# Patient Record
Sex: FEMALE | Race: WHITE | NOT HISPANIC OR LATINO | Employment: UNEMPLOYED | URBAN - METROPOLITAN AREA
[De-identification: names, ages, dates, MRNs, and addresses within clinical notes are randomized per-mention and may not be internally consistent; named-entity substitution may affect disease eponyms.]

---

## 2017-01-03 ENCOUNTER — ALLSCRIPTS OFFICE VISIT (OUTPATIENT)
Dept: OTHER | Facility: OTHER | Age: 62
End: 2017-01-03

## 2017-01-03 DIAGNOSIS — R63.5 ABNORMAL WEIGHT GAIN: ICD-10-CM

## 2017-01-03 DIAGNOSIS — Z12.31 ENCOUNTER FOR SCREENING MAMMOGRAM FOR MALIGNANT NEOPLASM OF BREAST: ICD-10-CM

## 2017-01-03 DIAGNOSIS — N83.209 CYST OF OVARY: ICD-10-CM

## 2017-01-06 LAB
ADEQUACY: (HISTORICAL): NORMAL
CLINICIAN PROVIDIED ICD 9 OR 10 (HISTORICAL): NORMAL
COMMENT (HISTORICAL): NORMAL
DIAGNOSIS (HISTORICAL): NORMAL
HPV HIGH RISK (HISTORICAL): NEGATIVE
NOTE: (HISTORICAL): NORMAL
PERFORMED BY (HISTORICAL): NORMAL
TEST INFORMATION (HISTORICAL): NORMAL

## 2017-01-19 ENCOUNTER — LAB (OUTPATIENT)
Dept: LAB | Facility: CLINIC | Age: 62
End: 2017-01-19
Payer: COMMERCIAL

## 2017-01-19 ENCOUNTER — ALLSCRIPTS OFFICE VISIT (OUTPATIENT)
Dept: OTHER | Facility: OTHER | Age: 62
End: 2017-01-19

## 2017-01-19 DIAGNOSIS — R63.5 ABNORMAL WEIGHT GAIN: ICD-10-CM

## 2017-01-19 LAB
ALBUMIN SERPL BCP-MCNC: 3.7 G/DL (ref 3.5–5)
ALP SERPL-CCNC: 80 U/L (ref 46–116)
ALT SERPL W P-5'-P-CCNC: 31 U/L (ref 12–78)
ANION GAP SERPL CALCULATED.3IONS-SCNC: 6 MMOL/L (ref 4–13)
AST SERPL W P-5'-P-CCNC: 9 U/L (ref 5–45)
BASOPHILS # BLD AUTO: 0.02 THOUSANDS/ΜL (ref 0–0.1)
BASOPHILS NFR BLD AUTO: 0 % (ref 0–1)
BILIRUB SERPL-MCNC: 0.47 MG/DL (ref 0.2–1)
BUN SERPL-MCNC: 15 MG/DL (ref 5–25)
CALCIUM SERPL-MCNC: 9 MG/DL (ref 8.3–10.1)
CHLORIDE SERPL-SCNC: 105 MMOL/L (ref 100–108)
CO2 SERPL-SCNC: 29 MMOL/L (ref 21–32)
CREAT SERPL-MCNC: 0.71 MG/DL (ref 0.6–1.3)
EOSINOPHIL # BLD AUTO: 0.24 THOUSAND/ΜL (ref 0–0.61)
EOSINOPHIL NFR BLD AUTO: 5 % (ref 0–6)
ERYTHROCYTE [DISTWIDTH] IN BLOOD BY AUTOMATED COUNT: 13.3 % (ref 11.6–15.1)
GFR SERPL CREATININE-BSD FRML MDRD: >60 ML/MIN/1.73SQ M
GLUCOSE SERPL-MCNC: 96 MG/DL (ref 65–140)
HCT VFR BLD AUTO: 43.4 % (ref 34.8–46.1)
HGB BLD-MCNC: 14.1 G/DL (ref 11.5–15.4)
LYMPHOCYTES # BLD AUTO: 1.5 THOUSANDS/ΜL (ref 0.6–4.47)
LYMPHOCYTES NFR BLD AUTO: 28 % (ref 14–44)
MCH RBC QN AUTO: 29.1 PG (ref 26.8–34.3)
MCHC RBC AUTO-ENTMCNC: 32.5 G/DL (ref 31.4–37.4)
MCV RBC AUTO: 90 FL (ref 82–98)
MONOCYTES # BLD AUTO: 0.41 THOUSAND/ΜL (ref 0.17–1.22)
MONOCYTES NFR BLD AUTO: 8 % (ref 4–12)
NEUTROPHILS # BLD AUTO: 3.16 THOUSANDS/ΜL (ref 1.85–7.62)
NEUTS SEG NFR BLD AUTO: 59 % (ref 43–75)
NRBC BLD AUTO-RTO: 0 /100 WBCS
PLATELET # BLD AUTO: 248 THOUSANDS/UL (ref 149–390)
PMV BLD AUTO: 11.7 FL (ref 8.9–12.7)
POTASSIUM SERPL-SCNC: 4.1 MMOL/L (ref 3.5–5.3)
PROT SERPL-MCNC: 6.9 G/DL (ref 6.4–8.2)
RBC # BLD AUTO: 4.84 MILLION/UL (ref 3.81–5.12)
SODIUM SERPL-SCNC: 140 MMOL/L (ref 136–145)
TSH SERPL DL<=0.05 MIU/L-ACNC: 2.01 UIU/ML (ref 0.36–3.74)
WBC # BLD AUTO: 5.35 THOUSAND/UL (ref 4.31–10.16)

## 2017-01-19 PROCEDURE — 84443 ASSAY THYROID STIM HORMONE: CPT

## 2017-01-19 PROCEDURE — 80053 COMPREHEN METABOLIC PANEL: CPT

## 2017-01-19 PROCEDURE — 36415 COLL VENOUS BLD VENIPUNCTURE: CPT

## 2017-01-19 PROCEDURE — 85025 COMPLETE CBC W/AUTO DIFF WBC: CPT

## 2017-01-20 ENCOUNTER — GENERIC CONVERSION - ENCOUNTER (OUTPATIENT)
Dept: OTHER | Facility: OTHER | Age: 62
End: 2017-01-20

## 2017-08-29 ENCOUNTER — HOSPITAL ENCOUNTER (OUTPATIENT)
Dept: MAMMOGRAPHY | Facility: CLINIC | Age: 62
Discharge: HOME | End: 2017-08-29
Payer: COMMERCIAL

## 2017-08-29 DIAGNOSIS — Z12.31 ENCOUNTER FOR SCREENING MAMMOGRAM FOR MALIGNANT NEOPLASM OF BREAST: ICD-10-CM

## 2017-08-29 PROCEDURE — 77063 BREAST TOMOSYNTHESIS BI: CPT

## 2017-08-29 PROCEDURE — G0202 SCR MAMMO BI INCL CAD: HCPCS

## 2018-01-10 NOTE — MISCELLANEOUS
Message  The patient called and left a message concerning that she was still having painful intercourse  I spoke with Dr Shrestha  and a [facility] was [facility] Estrace vaginal cream insert twice weekly  Active Problems    1  Abdominal pain (789 00) (R10 9)   2  Backache (724 5) (M54 9)   3  Candidiasis (112 9) (B37 9)   4  Dyspareunia (625 0)   5  Encounter for gynecological examination without abnormal finding (V72 31) (Z01 419)   6  Encounter for screening mammogram for malignant neoplasm of breast (V76 12)   (Z12 31)   7  Endometrial hyperplasia (621 30) (N85 00)   8  Endometrial polyp (621 0) (N84 0)   9  Gross hematuria (599 71) (R31 0)   10  Lower back pain (724 2) (M54 5)   11  Other muscle spasm (728 85) (M62 838)   12  Pain in female genitalia on intercourse (625 0) (N94 1)   13  Pelvic pain in female (625 9) (R10 2)   14  Pelvic pressure in female (625 8) (N94 89)   15  Postcoital bleeding (626 7) (N93 0)   16  Routine Gynecological Exam With Cervical Pap Smear (V72 31)   17  Routine Gynecological Exam With Cervical Pap Smear (V72 31)   18  Thickened endometrium (793 5) (R93 8)   19  Urinary tract infection (599 0) (N39 0)   20  Vaginal odor (625 8) (N89 8)    Current Meds   1  Fluconazole 150 MG Oral Tablet; TAKE 1 TABLET ONE TIME ONLY; Therapy: 00NYR4836 to ([place])  Requested for: 89RBV8275; Last   [id number] Ordered   2  Pyridium 100 MG Oral Tablet; TAKE 1 TABLET 3 TIMES DAILY AFTER MEALS; Therapy: 15MEJ5413 to (Bridgetta Ang)  Requested for: 64PWA0126; Last   Rx:[id number] Ordered   3  Sulfamethoxazole-Trimethoprim 800-160 MG Oral Tablet; Take 1 tablet twice daily; Therapy: 47YWA1049 to (Evaluate:[id number])  Requested for: 95JOV6251; Last   Rx:[id number] Ordered   4  Vitamin D 1000 UNIT CAPS; Take as directed Recorded    Allergies    1  Amoxicillin CAPS   2  Cipro TABS   3   Darvocet-N 100 TABS    Plan  Pain in female genitalia on intercourse    · Estrace 0 1 MG/GM Vaginal Cream; INSERT 1 APPLICATORFUL  INTRAVAGINALLY TWICE WEEKLY    Signatures   Electronically signed by : Susana Funes DO; [place]  3 2016 10:10AM EST                       (Author)

## 2018-01-12 VITALS
DIASTOLIC BLOOD PRESSURE: 70 MMHG | WEIGHT: 189.06 LBS | SYSTOLIC BLOOD PRESSURE: 122 MMHG | HEIGHT: 67 IN | BODY MASS INDEX: 29.67 KG/M2

## 2018-01-13 VITALS
WEIGHT: 188.31 LBS | HEART RATE: 68 BPM | RESPIRATION RATE: 16 BRPM | DIASTOLIC BLOOD PRESSURE: 76 MMHG | HEIGHT: 67 IN | BODY MASS INDEX: 29.56 KG/M2 | SYSTOLIC BLOOD PRESSURE: 114 MMHG

## 2018-01-15 NOTE — MISCELLANEOUS
Message  Message Free Text Note Form: thank you note sent for the [employer] [address] [employer]      Signatures   Electronically signed by : Timothy Barba DO; Jul 11 2017  3:35PM EST                       (Author)

## 2018-01-16 NOTE — MISCELLANEOUS
Message  called results of recent urine culture; agree with meds prescribed by Ty Melara      Plan  Candidiasis    · Fluconazole 150 MG Oral Tablet (Diflucan); TAKE 1 TABLET ONE TIME ONLY    Signatures   Electronically signed by : Elizabeth Greene DO; May  4 2016  6:39PM EST                       (Author)

## 2018-03-23 ENCOUNTER — OFFICE VISIT (OUTPATIENT)
Dept: URGENT CARE | Facility: MEDICAL CENTER | Age: 63
End: 2018-03-23
Payer: COMMERCIAL

## 2018-03-23 VITALS
HEART RATE: 89 BPM | SYSTOLIC BLOOD PRESSURE: 124 MMHG | WEIGHT: 185 LBS | TEMPERATURE: 98.1 F | HEIGHT: 67 IN | DIASTOLIC BLOOD PRESSURE: 78 MMHG | OXYGEN SATURATION: 97 % | RESPIRATION RATE: 18 BRPM | BODY MASS INDEX: 29.03 KG/M2

## 2018-03-23 DIAGNOSIS — N30.01 ACUTE CYSTITIS WITH HEMATURIA: Primary | ICD-10-CM

## 2018-03-23 LAB
SL AMB  POCT GLUCOSE, UA: ABNORMAL
SL AMB LEUKOCYTE ESTERASE,UA: ABNORMAL
SL AMB POCT BILIRUBIN,UA: ABNORMAL
SL AMB POCT BLOOD,UA: ABNORMAL
SL AMB POCT CLARITY,UA: ABNORMAL
SL AMB POCT COLOR,UA: ABNORMAL
SL AMB POCT KETONES,UA: ABNORMAL
SL AMB POCT NITRITE,UA: ABNORMAL
SL AMB POCT PH,UA: 5
SL AMB POCT SPECIFIC GRAVITY,UA: 1.02
SL AMB POCT URINE PROTEIN: ABNORMAL
SL AMB POCT UROBILINOGEN: ABNORMAL

## 2018-03-23 PROCEDURE — 99213 OFFICE O/P EST LOW 20 MIN: CPT | Performed by: PHYSICIAN ASSISTANT

## 2018-03-23 PROCEDURE — 87086 URINE CULTURE/COLONY COUNT: CPT | Performed by: PHYSICIAN ASSISTANT

## 2018-03-23 PROCEDURE — 87186 SC STD MICRODIL/AGAR DIL: CPT | Performed by: PHYSICIAN ASSISTANT

## 2018-03-23 PROCEDURE — 87077 CULTURE AEROBIC IDENTIFY: CPT | Performed by: PHYSICIAN ASSISTANT

## 2018-03-23 PROCEDURE — 81002 URINALYSIS NONAUTO W/O SCOPE: CPT | Performed by: PHYSICIAN ASSISTANT

## 2018-03-23 RX ORDER — SULFAMETHOXAZOLE AND TRIMETHOPRIM 800; 160 MG/1; MG/1
1 TABLET ORAL EVERY 12 HOURS SCHEDULED
Qty: 6 TABLET | Refills: 0 | Status: SHIPPED | OUTPATIENT
Start: 2018-03-23 | End: 2018-03-26

## 2018-03-24 NOTE — PATIENT INSTRUCTIONS

## 2018-03-24 NOTE — PROGRESS NOTES
[facility]        NAME: Shane Banuelos is a 63 y o  female  : 1955    MRN: [id number]  DATE: 2018  TIME: 9:42 PM    Assessment and Plan   Acute cystitis with hematuria [N30 01]  1  Acute cystitis with hematuria  POCT urine dip    Urine culture    sulfamethoxazole-trimethoprim (BACTRIM DS) 800-160 mg per tablet         Patient Instructions       Follow up with PCP in 3-5 days  Proceed to  ER if symptoms worsen  Chief Complaint     Chief Complaint   Patient presents with    Urinary Frequency     Pt c/o urinary burning, frequency, urgency, blood, chills, and lower back pain as of today         History of Present Illness       57-year-old female with history of UTIs presents complaining of dysuria, frequency and suprapubic pain x1 day  She has associated low back pain and chills but no fever, nausea or vomiting  She used azo around 4:30 p m  but did not experience any relief  Urine was positive for large amounts of blood and leukocytes  Last urine culture was in  grew E coli susceptible to Bactrim        Review of Systems   Review of Systems   Constitutional: Positive for chills  Negative for fever  Respiratory: Negative for cough and shortness of breath  Cardiovascular: Negative for chest pain and leg swelling  Gastrointestinal: Negative for nausea and vomiting  Genitourinary: Positive for dysuria, frequency, hematuria, pelvic pain and urgency  Negative for flank pain  Musculoskeletal: Positive for back pain           Current Medications       Current Outpatient Prescriptions:     sulfamethoxazole-trimethoprim (BACTRIM DS) 800-160 mg per tablet, Take 1 tablet by mouth every 12 (twelve) hours for 3 days, Disp: 6 tablet, Rfl: 0    Current Allergies     Allergies as of 2018 - Reviewed 2018   Allergen Reaction Noted    Amoxicillin      Ciprofloxacin Myalgia 2013            The following portions of the patient's history were reviewed and updated as appropriate: allergies, current medications, past family history, past medical history, past social history, past surgical history and problem list      History reviewed  No pertinent past medical history  History reviewed  No pertinent surgical history  History reviewed  No pertinent family history  Medications have been verified  Objective   /78   Pulse 89   Temp 98 1 °F (36 7 °C)   Resp 18   Ht 5' 7" (1 702 m)   Wt 83 9 kg (185 lb)   SpO2 97%   BMI 28 98 kg/m²        Physical Exam     Physical Exam   Constitutional: She appears well-developed and well-nourished  No distress  Cardiovascular: Normal rate, regular rhythm and normal heart sounds  Exam reveals no gallop and no friction rub  No murmur heard  Pulmonary/Chest: Effort normal and breath sounds normal  No respiratory distress  She has no wheezes  She has no rales  Abdominal: Soft  She exhibits no distension  There is tenderness in the suprapubic area  There is no rebound, no guarding and no CVA tenderness  Skin: She is not diaphoretic

## 2018-03-26 LAB — BACTERIA UR CULT: ABNORMAL

## 2018-03-28 ENCOUNTER — TELEPHONE (OUTPATIENT)
Dept: OBGYN CLINIC | Facility: MEDICAL CENTER | Age: 63
End: 2018-03-28

## 2018-03-28 DIAGNOSIS — O23.40: Primary | ICD-10-CM

## 2018-03-28 RX ORDER — SULFAMETHOXAZOLE AND TRIMETHOPRIM 800; 160 MG/1; MG/1
1 TABLET ORAL EVERY 12 HOURS SCHEDULED
Status: DISCONTINUED | OUTPATIENT
Start: 2018-03-28 | End: 2021-11-01

## 2018-03-28 NOTE — TELEPHONE ENCOUNTER
Pt  Calling for urine culture results  States she feels somewhat better but still has back soreness  Denies fever/chills    Also states that urine stream is "stronger" than usual

## 2018-08-28 ENCOUNTER — OFFICE VISIT (OUTPATIENT)
Dept: URGENT CARE | Facility: MEDICAL CENTER | Age: 63
End: 2018-08-28
Payer: COMMERCIAL

## 2018-08-28 VITALS
DIASTOLIC BLOOD PRESSURE: 80 MMHG | SYSTOLIC BLOOD PRESSURE: 125 MMHG | HEIGHT: 67 IN | TEMPERATURE: 97.6 F | WEIGHT: 175 LBS | BODY MASS INDEX: 27.47 KG/M2 | HEART RATE: 80 BPM | OXYGEN SATURATION: 97 % | RESPIRATION RATE: 18 BRPM

## 2018-08-28 DIAGNOSIS — N30.00 ACUTE CYSTITIS WITHOUT HEMATURIA: Primary | ICD-10-CM

## 2018-08-28 LAB
SL AMB  POCT GLUCOSE, UA: ABNORMAL
SL AMB LEUKOCYTE ESTERASE,UA: ABNORMAL
SL AMB POCT BILIRUBIN,UA: ABNORMAL
SL AMB POCT BLOOD,UA: ABNORMAL
SL AMB POCT CLARITY,UA: CLEAR
SL AMB POCT COLOR,UA: ABNORMAL
SL AMB POCT KETONES,UA: ABNORMAL
SL AMB POCT NITRITE,UA: ABNORMAL
SL AMB POCT PH,UA: 5
SL AMB POCT SPECIFIC GRAVITY,UA: 1.02
SL AMB POCT URINE PROTEIN: ABNORMAL
SL AMB POCT UROBILINOGEN: 0.2

## 2018-08-28 PROCEDURE — 87077 CULTURE AEROBIC IDENTIFY: CPT | Performed by: FAMILY MEDICINE

## 2018-08-28 PROCEDURE — 99213 OFFICE O/P EST LOW 20 MIN: CPT | Performed by: FAMILY MEDICINE

## 2018-08-28 PROCEDURE — 87186 SC STD MICRODIL/AGAR DIL: CPT | Performed by: FAMILY MEDICINE

## 2018-08-28 PROCEDURE — 87086 URINE CULTURE/COLONY COUNT: CPT | Performed by: FAMILY MEDICINE

## 2018-08-28 RX ORDER — NITROFURANTOIN 25; 75 MG/1; MG/1
100 CAPSULE ORAL 2 TIMES DAILY
Qty: 14 CAPSULE | Refills: 0 | Status: SHIPPED | OUTPATIENT
Start: 2018-08-28 | End: 2018-10-02 | Stop reason: ALTCHOICE

## 2018-08-29 NOTE — PROGRESS NOTES
Assessment:       1  Acute cystitis without hematuria  nitrofurantoin (MACROBID) 100 mg capsule    POCT urine dip    Urine culture         Plan:  Plan:      1  Medications: nitrofurantoin  2  Maintain adequate hydration  3  Follow up if symptoms not improving, and prn  Subjective:      Saadia Greene is a 63 y o  female who complains of burning with urination, dysuria, frequency and urgency for 1 day    Patient denies back pain, congestion, cough, fever, headache, rhinitis, sorethroat, stomach ache and vaginal discharge  Patient does not have a history of recurrent UTI  Patient does not have a history of pyelonephritis  The following portions of the patient's history were reviewed and updated as appropriate: allergies, current medications, past family history, past medical history, past social history, past surgical history and problem list     Review of Systems  Pertinent items are noted in HPI  Objective:      /80 (BP Location: Right arm, Patient Position: Sitting, Cuff Size: Standard)   Pulse 80   Temp 97 6 °F (36 4 °C) (Tympanic)   Resp 18   Ht 5' 7" (1 702 m)   Wt 79 4 kg (175 lb)   SpO2 97%   BMI 27 41 kg/m²   General: alert and oriented, in no acute distress   Abdomen: soft, non-tender, without masses or organomegaly   Back: CVA tenderness absent   : defer exam   Cardiac:  Pansystolic murmur heard best in the aortic area    Laboratory:   Urine dipstick shows 3+ for leukocyte esterase and 3+ for nitrites

## 2018-08-29 NOTE — PATIENT INSTRUCTIONS
Urinary Tract Infection in Women   AMBULATORY CARE:   A urinary tract infection (UTI)  is caused by bacteria that get inside your urinary tract  Most bacteria that enter your urinary tract come out when you urinate  If the bacteria stay in your urinary tract, you may get an infection  Your urinary tract includes your kidneys, ureters, bladder, and urethra  Urine is made in your kidneys, and it flows from the ureters to the bladder  Urine leaves the bladder through the urethra  A UTI is more common in your lower urinary tract, which includes your bladder and urethra  Common symptoms include the following:   · Urinating more often or waking from sleep to urinate    · Pain or burning when you urinate    · Pain or pressure in your lower abdomen     · Urine that smells bad    · Blood in your urine    · Leaking urine  Seek care immediately if:   · You are urinating very little or not at all  · You have a high fever with shaking chills  · You have side or back pain that gets worse  Contact your healthcare provider if:   · You have a fever  · You do not feel better after 2 days of taking antibiotics  · You are vomiting  · You have questions or concerns about your condition or care  Treatment for a UTI  may include medicines to treat a bacterial infection  You may also need medicines to decrease pain and burning, or decrease the urge to urinate often  Prevent a UTI:   · Empty your bladder often  Urinate and empty your bladder as soon as you feel the need  Do not hold your urine for long periods of time  · Wipe from front to back after you urinate or have a bowel movement  This will help prevent germs from getting into your urinary tract through your urethra  · Drink liquids as directed  Ask how much liquid to drink each day and which liquids are best for you  You may need to drink more liquids than usual to help flush out the bacteria  Do not drink alcohol, caffeine, or citrus juices  These can irritate your bladder and increase your symptoms  Your healthcare provider may recommend cranberry juice to help prevent a UTI  · Urinate after you have sex  This can help flush out bacteria passed during sex  · Do not douche or use feminine deodorants  These can change the chemical balance in your vagina  · Change sanitary pads or tampons often  This will help prevent germs from getting into your urinary tract  · Do pelvic muscle exercises often  Pelvic muscle exercises may help you start and stop urinating  Strong pelvic muscles may help you empty your bladder easier  Squeeze these muscles tightly for 5 seconds like you are trying to hold back urine  Then relax for 5 seconds  Gradually work up to squeezing for 10 seconds  Do 3 sets of 15 repetitions a day, or as directed  Follow up with your healthcare provider as directed:  Write down your questions so you remember to ask them during your visits  © 2017 [employer] Information is for End User's use only and may not be sold, redistributed or otherwise used for commercial purposes  All illustrations and images included in CareNotes® are the copyrighted property of [employer]  or [employer]  The above information is an  only  It is not intended as medical advice for individual conditions or treatments  Talk to your doctor, nurse or pharmacist before following any medical regimen to see if it is safe and effective for you

## 2018-08-31 LAB — BACTERIA UR CULT: ABNORMAL

## 2018-09-05 ENCOUNTER — ANNUAL EXAM (OUTPATIENT)
Dept: OBGYN CLINIC | Facility: MEDICAL CENTER | Age: 63
End: 2018-09-05
Payer: COMMERCIAL

## 2018-09-05 VITALS — WEIGHT: 176 LBS | BODY MASS INDEX: 27.57 KG/M2 | SYSTOLIC BLOOD PRESSURE: 120 MMHG | DIASTOLIC BLOOD PRESSURE: 74 MMHG

## 2018-09-05 DIAGNOSIS — Z01.419 ENCNTR FOR GYN EXAM (GENERAL) (ROUTINE) W/O ABN FINDINGS: Primary | ICD-10-CM

## 2018-09-05 DIAGNOSIS — Z12.31 ENCOUNTER FOR SCREENING MAMMOGRAM FOR MALIGNANT NEOPLASM OF BREAST: ICD-10-CM

## 2018-09-05 DIAGNOSIS — N95.2 ATROPHIC VAGINITIS: ICD-10-CM

## 2018-09-05 PROBLEM — R63.5 ABNORMAL WEIGHT GAIN: Status: ACTIVE | Noted: 2017-01-19

## 2018-09-05 PROCEDURE — S0612 ANNUAL GYNECOLOGICAL EXAMINA: HCPCS | Performed by: OBSTETRICS & GYNECOLOGY

## 2018-09-05 RX ORDER — ESTRADIOL 0.1 MG/G
CREAM VAGINAL
Qty: 42.5 G | Refills: 1 | Status: SHIPPED | OUTPATIENT
Start: 2018-09-05 | End: 2018-10-02 | Stop reason: ALTCHOICE

## 2018-09-05 NOTE — PROGRESS NOTES
ASSESSMENT & PLAN: Brie Wagner was seen today for gynecologic exam     Diagnoses and all orders for this visit:    Encntr for gyn exam (general) (routine) w/o abn findings    Encounter for screening mammogram for malignant neoplasm of breast  -     Mammo screening bilateral w 3d & cad; Future    Atrophic vaginitis  -     estradiol (ESTRACE) 0 1 mg/g vaginal cream; Apply externally three times per week    Discussion/Summary: discussed her dyspareunia; advised to try coconut oil, but [facility]    1  Routine well woman exam done today  2  Pap and HPV:  The patient's pap is up to date  Pap and cotesting was not done today  Current ASCCP Guidelines reviewed  3   Mammogram was ordered  4  Colonoscopy is up to date  4  The following were reviewed in today's visit: breast self exam   5  F/u 1 year      CC:  Annual Gynecologic Examination    HPI: Marleny Friedman is a 63 y o  who presents for annual gynecologic examination  She has the following concerns:  See above    Health Maintenance:    Patient describes her health as good  Patient has weight concerns  She exercises 5 days per week with walking  She doeswears her seatbelt routinely  She does perform regular monthly self breast exams  She does feel safe at home  Patients does not follow a  diet  Patient gets 4 servings of dairy or calcium rich foods a day  Last pap: 2016  Last mammogram: 2017  Last colonoscopy: unknown date    Patient Active Problem List   Diagnosis    Acute cystitis with hematuria    Abnormal weight gain    Backache    Dyspareunia    Head injury    Macular degeneration    Other muscle spasm    Pain in female genitalia on intercourse       History reviewed  No pertinent past medical history  History reviewed  No pertinent surgical history  Past OB/Gyn History:    History of abnormal pap smears: No    Patient is currently sexually active  monogamous     Patient's family planning method is post menopausal status  History reviewed  No pertinent family history  Social History:  Social History     Social History    Marital status: /Civil Union     Spouse name: N/A    Number of children: N/A    Years of education: N/A     Occupational History    Not on file  Social History Main Topics    Smoking status: Never Smoker    Smokeless tobacco: Never Used    Alcohol use No    Drug use: No    Sexual activity: Yes     Partners: Male     Other Topics Concern    Not on file     Social History Narrative    No narrative on file     Presently lives with family  Patient is currently unemployed   Allergies   Allergen Reactions    Amoxicillin Rash and Shortness Of Breath    Ciprofloxacin Myalgia         Current Outpatient Prescriptions:     Naphazoline-Polyethyl Glycol 0 012-0 2 % SOLN, Apply to eye, Disp: , Rfl:     estradiol (ESTRACE) 0 1 mg/g vaginal cream, Apply externally three times per week, Disp: 42 5 g, Rfl: 1    nitrofurantoin (MACROBID) 100 mg capsule, Take 1 capsule (100 mg total) by mouth 2 (two) times a day (Patient not taking: Reported on 9/5/2018 ), Disp: 14 capsule, Rfl: 0    Current Facility-Administered Medications:     sulfamethoxazole-trimethoprim (BACTRIM DS) 800-160 mg per tablet 1 tablet, 1 tablet, Oral, Q12H [facility], Trudi Rubinstein,     Review of Systems  Constitutional :no fever, feels well, no tiredness, no recent weight gain or loss  ENT: no ear ache, no loss of hearing, no nosebleeds or nasal discharge, no sore throat or hoarseness  Cardiovascular: no complaints of slow or fast heart beat, no chest pain, no palpitations, no leg claudication or lower extremity edema    Respiratory: no complaints of shortness of shortness of breath, no WILSON  Breasts:no complaints of breast pain, breast lump, or nipple discharge  Gastrointestinal: no complaints of abdominal pain, constipation, nausea, vomiting, or diarrhea or bloody stools  Genitourinary : no complaints of dysuria, incontinence, pelvic pain, no dysmenorrhea, vaginal discharge or abnormal vaginal bleeding and as noted in HPI  Musculoskeletal: no complaints of arthralgia, no myalgia, no joint swelling or stiffness, no limb pain or swelling  Integumentary: no complaints of skin rash or lesion, itching or dry skin  Neurological: no complaints of headache, no confusion, no numbness or tingling, no dizziness or fainting    Physical Exam:     /74   Wt 79 8 kg (176 lb)   BMI 27 57 kg/m²     General: appears stated age, cooperative, alert normal mood and affect   Psychiatric oriented to person, place and time  Mood and affect normal   Neck: normal, supple,trachea midline, no masses  Thyroid: normal, no thyromegaly   Heart: regular rate and rhythm, S1, S2 normal, no murmur, click, rub or gallop   Lungs: clear to auscultation bilaterally, no increased work of breathing or signs of respiratory distress   Breasts: normal, no dimpling or skin changes noted   Abdomen: soft, non-tender, without masses or organomegaly   Vulva: normal , no lesions   Vagina: normal , no lesions or dryness   Urethra: normal   Urethal meatus normal   Bladder Normal, soft, non-tender and no prolapse or masses appreciated   Cervix: normal, no palpable masses    Uterus: normal , non-tender, not enlarged, no palpable masses   Adnexa: normal, non-tender without fullness or masses;hemoccult neg  Lymphatic Palpation of lymph nodes in neck, axilla, groin and/or other locations: no lymphadenopathy or masses noted   Skin Normal skin turgor and no rashes    Palpation of skin and subcutaneous tissue normal

## 2018-09-26 ENCOUNTER — HOSPITAL ENCOUNTER (OUTPATIENT)
Dept: MAMMOGRAPHY | Facility: MEDICAL CENTER | Age: 63
Discharge: HOME | End: 2018-09-26
Payer: COMMERCIAL

## 2018-09-26 DIAGNOSIS — Z12.31 ENCOUNTER FOR SCREENING MAMMOGRAM FOR MALIGNANT NEOPLASM OF BREAST: ICD-10-CM

## 2018-09-26 PROCEDURE — 77063 BREAST TOMOSYNTHESIS BI: CPT

## 2018-09-26 PROCEDURE — 77067 SCR MAMMO BI INCL CAD: CPT

## 2018-10-02 ENCOUNTER — OFFICE VISIT (OUTPATIENT)
Dept: FAMILY MEDICINE CLINIC | Facility: CLINIC | Age: 63
End: 2018-10-02
Payer: COMMERCIAL

## 2018-10-02 VITALS
DIASTOLIC BLOOD PRESSURE: 60 MMHG | BODY MASS INDEX: 27.64 KG/M2 | RESPIRATION RATE: 12 BRPM | WEIGHT: 172 LBS | HEIGHT: 66 IN | HEART RATE: 84 BPM | SYSTOLIC BLOOD PRESSURE: 104 MMHG

## 2018-10-02 DIAGNOSIS — R01.1 SYSTOLIC MURMUR: Primary | ICD-10-CM

## 2018-10-02 DIAGNOSIS — R63.5 WEIGHT GAIN: ICD-10-CM

## 2018-10-02 PROCEDURE — 99213 OFFICE O/P EST LOW 20 MIN: CPT | Performed by: NURSE PRACTITIONER

## 2018-10-02 PROCEDURE — 1036F TOBACCO NON-USER: CPT | Performed by: NURSE PRACTITIONER

## 2018-10-02 NOTE — PROGRESS NOTES
Chief Complaint   Patient presents with    Heart Murmur     Assessment/Plan:    1  Systolic murmur   Please schedule the echo and we will call you with the results  - Echo complete with contrast if indicated; Future    rto rpn       Subjective:      Patient ID: Sea Humphrey is a 63 y o  female  Here today with concern regarding a heart murmer  Was at [facility] the end of august for uti and was told at that time that she had a murmer  Had her routine gyn exam couple weeks lager and was told again that she had a murmer  Only past cardiac issues are history of tachycardia that started after a snow tubing accident in 2005  Was seen in the er for this and gets rare self resolving episodes that are rare   denies any edema, chest pain or sob  The following portions of the patient's history were reviewed and updated as appropriate: allergies, current medications, past family history, past medical history, past social history, past surgical history and problem list     Past Medical History:   Diagnosis Date    Endometrial hyperplasia, unspecified     resolved 11/26/2014     Past Surgical History:   Procedure Laterality Date    CYSTOSCOPY  12/15/2014    last assesed 12/15/2014, managed by Horace Gavin     Family History   Problem Relation Age of Onset    Cancer Mother     No Known Problems Father     No Known Problems Sister     No Known Problems Brother     Breast cancer Family     Ovarian cancer Family     Substance Abuse Neg Hx     Mental illness Neg Hx      Social History   Social History     Social History    Marital status: /Civil Union     Spouse name: N/A    Number of children: N/A    Years of education: N/A     Occupational History    Not on file       Social History Main Topics    Smoking status: Never Smoker    Smokeless tobacco: Never Used    Alcohol use No    Drug use: No    Sexual activity: Yes     Partners: Male     Other Topics Concern    Not on file Social History Narrative    Always uses seat belt    Caffeine use - coffee, green tea amount unspecified    Has smoke detectors         Review of Systems   Constitutional: Negative  Respiratory: Negative  Cardiovascular: Negative  Musculoskeletal: Negative  Skin: Negative  Neurological: Negative  Psychiatric/Behavioral: Negative  Vitals:    10/02/18 1353   BP: 104/60   BP Location: Left arm   Patient Position: Sitting   Pulse: 84   Resp: 12   Weight: 78 kg (172 lb)   Height: 5' 6" (1 676 m)       Objective:   Wt Readings from Last 3 Encounters:   10/02/18 78 kg (172 lb)   09/05/18 79 8 kg (176 lb)   08/28/18 79 4 kg (175 lb)     BP Readings from Last 3 Encounters:   10/02/18 104/60   09/05/18 120/74   08/28/18 125/80     Pulse Readings from Last 3 Encounters:   10/02/18 84   08/28/18 80   03/23/18 89     BMI Readings from Last 3 Encounters:   10/02/18 27 76 kg/m²   09/05/18 27 57 kg/m²   08/28/18 27 41 kg/m²     Office Visit on 08/28/2018   Component Date Value Ref Range Status    LEUKOCYTE ESTERASE,UA 08/28/2018 neg   Final    NITRITE,UA 08/28/2018 pos   Final    SL AMB POCT UROBILINOGEN 08/28/2018 0 2   Final    SL AMB POCT URINE PROTEIN 08/28/2018 30+   Final     PH,UA 08/28/2018 5 0   Final     BLOOD,UA 08/28/2018 trace   Final    SPECIFIC GRAVITY,UA 08/28/2018 1 020   Final    KETONES,UA 08/28/2018 small   Final     BILIRUBIN,UA 08/28/2018 small   Final    GLUCOSE, UA 08/28/2018 neg   Final     COLOR,UA 08/28/2018 orange   Final     CLARITY,UA 08/28/2018 clear   Final    Urine Culture 08/28/2018 20,000-29,000 cfu/ml Escherichia coli*  Final   Office Visit on 03/23/2018   Component Date Value Ref Range Status    LEUKOCYTE ESTERASE,UA 03/23/2018 large   Final    NITRITE,UA 03/23/2018 neg   Final    SL AMB POCT UROBILINOGEN 03/23/2018 neg   Final    SL AMB POCT URINE PROTEIN 03/23/2018 trace   Final     PH,UA 03/23/2018 5 0   Final     BLOOD,UA 03/23/2018 hemolyzed large   Final    SPECIFIC GRAVITY,UA 03/23/2018 1 020   Final    KETONES,UA 03/23/2018 neg   Final     BILIRUBIN,UA 03/23/2018 neg   Final    GLUCOSE, UA 03/23/2018 neg   Final     COLOR,UA 03/23/2018 orange   Final     CLARITY,UA 03/23/2018 cloudy   Final    Urine Culture 03/23/2018 >100,000 cfu/ml Escherichia coli*  Final   Lab on 01/19/2017   Component Date Value Ref Range Status    Sodium 01/19/2017 140  136 - 145 mmol/L Final    Potassium 01/19/2017 4 1  3 5 - 5 3 mmol/L Final    Chloride 01/19/2017 105  100 - 108 mmol/L Final    CO2 01/19/2017 29  21 - 32 mmol/L Final    ANION GAP 01/19/2017 6  4 - 13 mmol/L Final    BUN 01/19/2017 15  5 - 25 mg/dL Final    Creatinine 01/19/2017 0 71  0 60 - 1 30 mg/dL Final    Standardized to IDMS reference method    Glucose 01/19/2017 96  65 - 140 mg/dL Final    If the patient is fasting, the ADA then defines impaired fasting glucose as > 100 mg/dL and diabetes as > or equal to 123 mg/dL      Calcium 01/19/2017 9 0  8 3 - 10 1 mg/dL Final    AST 01/19/2017 9  5 - 45 U/L Final    ALT 01/19/2017 31  12 - 78 U/L Final    Alkaline Phosphatase 01/19/2017 80  46 - 116 U/L Final    Total Protein 01/19/2017 6 9  6 4 - 8 2 g/dL Final    Albumin 01/19/2017 3 7  3 5 - 5 0 g/dL Final    Total Bilirubin 01/19/2017 0 47  0 20 - 1 00 mg/dL Final    eGFR 01/19/2017 >60 0  ml/min/1 73sq m Final    WBC 01/19/2017 5 35  4 31 - 10 16 Thousand/uL Final    RBC 01/19/2017 4 84  3 81 - 5 12 Million/uL Final    Hemoglobin 01/19/2017 14 1  11 5 - 15 4 g/dL Final    Hematocrit 01/19/2017 43 4  34 8 - 46 1 % Final    MCV 01/19/2017 90  82 - 98 fL Final    MCH 01/19/2017 29 1  26 8 - 34 3 pg Final    MCHC 01/19/2017 32 5  31 4 - 37 4 g/dL Final    RDW 01/19/2017 13 3  11 6 - 15 1 % Final    MPV 01/19/2017 11 7  8 9 - 12 7 fL Final    Platelets 25/40/0302 248  149 - 390 Thousands/uL Final    nRBC 01/19/2017 0  /100 WBCs Final    Neutrophils Relative 01/19/2017 59  43 - 75 % Final    Lymphocytes Relative 01/19/2017 28  14 - 44 % Final    Monocytes Relative 01/19/2017 8  4 - 12 % Final    Eosinophils Relative 01/19/2017 5  0 - 6 % Final    Basophils Relative 01/19/2017 0  0 - 1 % Final    Neutrophils Absolute 01/19/2017 3 16  1 85 - 7 62 Thousands/µL Final    Lymphocytes Absolute 01/19/2017 1 50  0 60 - 4 47 Thousands/µL Final    Monocytes Absolute 01/19/2017 0 41  0 17 - 1 22 Thousand/µL Final    Eosinophils Absolute 01/19/2017 0 24  0 00 - 0 61 Thousand/µL Final    Basophils Absolute 01/19/2017 0 02  0 00 - 0 10 Thousands/µL Final    TSH 3RD GENERATON 01/19/2017 2 010  0 358 - 3 740 uIU/mL Final   [employer] Office Visit on 01/03/2017   Component Date Value Ref Range Status    DIAGNOSIS (HISTORICAL) 01/04/2017 Comment   Final    Result Comment: NEGATIVE FOR INTRAEPITHELIAL LESION AND MALIGNANCY   Adequacy: (HISTORICAL) 01/04/2017 Comment   Final    Comment: Result Comment: Satisfactory for evaluation  Endocervical and/or squamous metaplastic  cells (endocervical component) are present   CLINICIAN PROVIDIED ICD 9 OR 10 (H* 01/04/2017 Comment   Final    Comment: Result Comment: Z01 [id number]  Z11 [id number]      PERFORMED BY (HISTORICAL) 01/04/2017 Comment   Final    Result Comment: Kennedy Vasques, Cytotechnologist (ASCP)    Comment 01/04/2017   Final    NOTE: 01/04/2017 Comment   Final    Comment: Result Comment: The Pap smear is a screening test designed to aid in the detection of  premalignant and malignant conditions of the uterine cervix  It is not a  diagnostic procedure and should not be used as the sole means of detecting  cervical cancer  Both false-positive and false-negative reports do occur   TEST INFORMATION 01/04/2017 Comment   Final    Comment: Result Comment: This liquid based ThinPrep(R) pap test was screened with the  use of an image guided system   HPV HIGH RISK 01/04/2017 Negative  Negative Final    Comment: Result Comment:  This high-risk HPV test detects thirteen high-risk types  (16/18/31/33/35/39/45/51/52/56/58/59/68) without differentiation  Performed at: [facility], [address] , , [place], [place], [id number], [id number]  MD:  Cris Shannon  [address] [place] [address] Rd [phone]            Physical Exam   Constitutional: She is oriented to person, place, and time  She appears well-developed and well-nourished  Neck: Normal range of motion  Neck supple  No thyromegaly present  Cardiovascular: Normal rate, regular rhythm and S1 normal   Exam reveals no distant heart sounds  Murmur heard  Systolic murmur is present with a grade of 2/6   Pulmonary/Chest: Effort normal and breath sounds normal  No respiratory distress  She has no wheezes  Musculoskeletal: Normal range of motion  Neurological: She is alert and oriented to person, place, and time  Skin: Skin is warm and dry  Psychiatric: She has a normal mood and affect   Her behavior is normal  Judgment and thought content normal

## 2018-10-05 ENCOUNTER — HOSPITAL ENCOUNTER (OUTPATIENT)
Dept: NON INVASIVE DIAGNOSTICS | Facility: CLINIC | Age: 63
Discharge: HOME | End: 2018-10-05
Payer: COMMERCIAL

## 2018-10-05 DIAGNOSIS — R01.1 SYSTOLIC MURMUR: ICD-10-CM

## 2018-10-05 PROCEDURE — 93306 TTE W/DOPPLER COMPLETE: CPT | Performed by: INTERNAL MEDICINE

## 2018-10-05 PROCEDURE — 93306 TTE W/DOPPLER COMPLETE: CPT

## 2020-02-25 ENCOUNTER — ANNUAL EXAM (OUTPATIENT)
Dept: OBGYN CLINIC | Facility: MEDICAL CENTER | Age: 65
End: 2020-02-25
Payer: COMMERCIAL

## 2020-02-25 VITALS
BODY MASS INDEX: 28.53 KG/M2 | HEIGHT: 66 IN | SYSTOLIC BLOOD PRESSURE: 122 MMHG | WEIGHT: 177.5 LBS | DIASTOLIC BLOOD PRESSURE: 70 MMHG

## 2020-02-25 DIAGNOSIS — Z12.31 ENCOUNTER FOR SCREENING MAMMOGRAM FOR MALIGNANT NEOPLASM OF BREAST: ICD-10-CM

## 2020-02-25 DIAGNOSIS — Z01.419 ENCNTR FOR GYN EXAM (GENERAL) (ROUTINE) W/O ABN FINDINGS: Primary | ICD-10-CM

## 2020-02-25 PROCEDURE — S0612 ANNUAL GYNECOLOGICAL EXAMINA: HCPCS | Performed by: OBSTETRICS & GYNECOLOGY

## 2020-02-25 PROCEDURE — 3008F BODY MASS INDEX DOCD: CPT | Performed by: OBSTETRICS & GYNECOLOGY

## 2020-02-25 NOTE — PROGRESS NOTES
ASSESSMENT & PLAN: Lady Stein was seen today for gynecologic exam     Diagnoses and all orders for this visit:    Encntr for gyn exam (general) (routine) w/o abn findings    Encounter for screening mammogram for malignant neoplasm of breast  -     Mammo screening bilateral w 3d & cad; Future    Discussion/Summary:  Patient here for yearly gyn preventive exam; no new health issues; seen with hakan Richards  Student, Bill  1   Routine well woman exam done today  2  Pap and HPV:  The patient's pap is up to date  Pap and cotesting was not done today  Current ASCCP Guidelines reviewed  3   Mammogram was ordered  4  Colorectal cancer screening is up to date  4  The following were reviewed in today's visit: breast self exam, adequate intake of calcium and vitamin D, exercise and healthy diet  5  F/u 1 year  CC:  Annual Gynecologic Examination    HPI: Elias Perez is a 60 y o  who presents for annual gynecologic examination  She has the following concerns:  none    Health Maintenance:    Patient describes her health as good  Patient does not have weight concerns  She exercises 7 days per week with walking  She does wear her seatbelt routinely  She does perform regular monthly self breast exams  She does feel safe at home  Patients does not follow a  diet  Patient gets 5 servings of dairy or calcium rich foods a day      Last pap: 2017  Last mammogram: 2019  Last colorectal cancer screening: unknown date    Patient Active Problem List   Diagnosis    Acute cystitis with hematuria    Abnormal weight gain    Backache    Dyspareunia    Head injury    Macular degeneration    Other muscle spasm    Pain in female genitalia on intercourse       Past Medical History:   Diagnosis Date    Endometrial hyperplasia, unspecified     resolved 11/26/2014       Past Surgical History:   Procedure Laterality Date    CYSTOSCOPY  12/15/2014    last assesed 12/15/2014, managed by Penney Fothergill Past OB/Gyn History:    History of abnormal pap smears: No    Patient is currently sexually active  monogamous   Patient's family planning method is post menopausal status      Family History   Problem Relation Age of Onset   Stefano Moose Cancer Mother     No Known Problems Father     No Known Problems Sister     No Known Problems Brother     Breast cancer Family     Ovarian cancer Family     Substance Abuse Neg Hx     Mental illness Neg Hx        Social History:  Social History     Socioeconomic History    Marital status: /Civil Union     Spouse name: Not on file    Number of children: Not on file    Years of education: Not on file    Highest education level: Not on file   Occupational History    Not on file   Social Needs    Financial resource strain: Not on file    Food insecurity:     Worry: Not on file     Inability: Not on file    Transportation needs:     Medical: Not on file     Non-medical: Not on file   Tobacco Use    Smoking status: Never Smoker    Smokeless tobacco: Never Used   Substance and Sexual Activity    Alcohol use: No    Drug use: No    Sexual activity: Yes     Partners: Male   Lifestyle    Physical activity:     Days per week: Not on file     Minutes per session: Not on file    Stress: Not on file   Relationships    Social connections:     Talks on phone: Not on file     Gets together: Not on file     Attends Taoist service: Not on file     Active member of club or organization: Not on file     Attends meetings of clubs or organizations: Not on file     Relationship status: Not on file    Intimate partner violence:     Fear of current or ex partner: Not on file     Emotionally abused: Not on file     Physically abused: Not on file     Forced sexual activity: Not on file   Other Topics Concern    Not on file   Social History Narrative    Always uses seat belt    Caffeine use - coffee, green tea amount unspecified    Has smoke detectors     Presently lives with family  Patient is currently unemployed   Allergies   Allergen Reactions    Amoxicillin Rash and Shortness Of Breath    Ciprofloxacin Myalgia    Darvon [Propoxyphene] Dizziness         Current Outpatient Medications:     Cholecalciferol 50 MCG (2000 UT) CAPS, Take 1 capsule by mouth, Disp: , Rfl:     Multiple Vitamins-Minerals (PRESERVISION AREDS 2+MULTI VIT PO), One twice a day , Disp: , Rfl:     polyethylene glycol-propylene glycol (Systane) 0 4-0 3 %, Apply to eye, Disp: , Rfl:     Current Facility-Administered Medications:     sulfamethoxazole-trimethoprim (BACTRIM DS) 800-160 mg per tablet 1 tablet, 1 tablet, Oral, Q12H [address], Arlet Mars, DO    Review of Systems  Constitutional :no fever, feels well, no tiredness, no recent weight gain or loss  ENT: no ear ache, no loss of hearing, no nosebleeds or nasal discharge, no sore throat or hoarseness  Cardiovascular: no complaints of slow or fast heart beat, no chest pain, no palpitations, no leg claudication or lower extremity edema  Respiratory: no complaints of shortness of shortness of breath, no WILSON  Breasts:no complaints of breast pain, breast lump, or nipple discharge  Gastrointestinal: no complaints of abdominal pain, constipation, nausea, vomiting, or diarrhea or bloody stools  Genitourinary : no complaints of dysuria, incontinence, pelvic pain, no dysmenorrhea, vaginal discharge or abnormal vaginal bleeding and as noted in HPI  Musculoskeletal: no complaints of arthralgia, no myalgia, no joint swelling or stiffness, no limb pain or swelling    Integumentary: no complaints of skin rash or lesion, itching or dry skin  Neurological: no complaints of headache, no confusion, no numbness or tingling, no dizziness or fainting    Physical Exam:     /70   Ht 5' 6" (1 676 m)   Wt 80 5 kg (177 lb 8 oz)   LMP  (LMP Unknown)   Breastfeeding No   BMI 28 65 kg/m²     General: appears stated age, cooperative, alert normal mood and affect Psychiatric oriented to person, place and time  Mood and affect normal   Neck: normal, supple,trachea midline, no masses  Thyroid: normal, no thyromegaly   Heart: regular rate and rhythm, S1, S2 normal, no murmur, click, rub or gallop   Lungs: clear to auscultation bilaterally, no increased work of breathing or signs of respiratory distress   Breasts: normal, no dimpling or skin changes noted   Abdomen: soft, non-tender, without masses or organomegaly   Vulva: normal , no lesions   Vagina: normal , no lesions or dryness   Urethra: normal   Urethal meatus normal   Bladder Normal, soft, non-tender and no prolapse or masses appreciated   Cervix: normal, no palpable masses    Uterus: normal , non-tender, not enlarged, no palpable masses   Adnexa: normal, non-tender without fullness or masses; hemoccult neg  Lymphatic Palpation of lymph nodes in neck, axilla, groin and/or other locations: no lymphadenopathy or masses noted   Skin Normal skin turgor and no rashes    Palpation of skin and subcutaneous tissue normal

## 2020-05-12 ENCOUNTER — TELEPHONE (OUTPATIENT)
Dept: OBGYN CLINIC | Facility: MEDICAL CENTER | Age: 65
End: 2020-05-12

## 2021-04-17 ENCOUNTER — IMMUNIZATIONS (OUTPATIENT)
Dept: FAMILY MEDICINE CLINIC | Facility: HOSPITAL | Age: 66
End: 2021-04-17

## 2021-04-17 DIAGNOSIS — Z23 ENCOUNTER FOR IMMUNIZATION: Primary | ICD-10-CM

## 2021-04-17 PROCEDURE — 0001A SARS-COV-2 / COVID-19 MRNA VACCINE (PFIZER-BIONTECH) 30 MCG: CPT | Performed by: FAMILY MEDICINE

## 2021-04-17 PROCEDURE — 91300 SARS-COV-2 / COVID-19 MRNA VACCINE (PFIZER-BIONTECH) 30 MCG: CPT | Performed by: FAMILY MEDICINE

## 2021-05-11 ENCOUNTER — IMMUNIZATIONS (OUTPATIENT)
Dept: FAMILY MEDICINE CLINIC | Facility: HOSPITAL | Age: 66
End: 2021-05-11

## 2021-05-11 DIAGNOSIS — Z23 ENCOUNTER FOR IMMUNIZATION: Primary | ICD-10-CM

## 2021-05-11 PROCEDURE — 91300 SARS-COV-2 / COVID-19 MRNA VACCINE (PFIZER-BIONTECH) 30 MCG: CPT

## 2021-05-11 PROCEDURE — 0002A SARS-COV-2 / COVID-19 MRNA VACCINE (PFIZER-BIONTECH) 30 MCG: CPT

## 2021-11-01 ENCOUNTER — OFFICE VISIT (OUTPATIENT)
Dept: OBGYN CLINIC | Facility: MEDICAL CENTER | Age: 66
End: 2021-11-01
Payer: COMMERCIAL

## 2021-11-01 VITALS
WEIGHT: 168 LBS | BODY MASS INDEX: 26.37 KG/M2 | DIASTOLIC BLOOD PRESSURE: 70 MMHG | SYSTOLIC BLOOD PRESSURE: 110 MMHG | HEIGHT: 67 IN

## 2021-11-01 DIAGNOSIS — M62.89 PELVIC FLOOR DYSFUNCTION: Primary | ICD-10-CM

## 2021-11-01 PROBLEM — N30.01 ACUTE CYSTITIS WITH HEMATURIA: Status: RESOLVED | Noted: 2018-03-23 | Resolved: 2021-11-01

## 2021-11-01 PROBLEM — Z80.0 FAMILY HISTORY OF COLON CANCER: Status: ACTIVE | Noted: 2019-02-08

## 2021-11-01 PROCEDURE — 99213 OFFICE O/P EST LOW 20 MIN: CPT | Performed by: NURSE PRACTITIONER

## 2021-11-08 ENCOUNTER — EVALUATION (OUTPATIENT)
Dept: PHYSICAL THERAPY | Facility: REHABILITATION | Age: 66
End: 2021-11-08
Payer: COMMERCIAL

## 2021-11-08 ENCOUNTER — TELEPHONE (OUTPATIENT)
Dept: OBGYN CLINIC | Facility: MEDICAL CENTER | Age: 66
End: 2021-11-08

## 2021-11-08 DIAGNOSIS — R10.2 PELVIC PAIN: Primary | ICD-10-CM

## 2021-11-08 DIAGNOSIS — M62.89 PELVIC FLOOR DYSFUNCTION: ICD-10-CM

## 2021-11-08 PROCEDURE — 97162 PT EVAL MOD COMPLEX 30 MIN: CPT | Performed by: PHYSICAL THERAPIST

## 2021-11-08 PROCEDURE — 97112 NEUROMUSCULAR REEDUCATION: CPT | Performed by: PHYSICAL THERAPIST

## 2021-11-10 ENCOUNTER — APPOINTMENT (OUTPATIENT)
Dept: PHYSICAL THERAPY | Facility: REHABILITATION | Age: 66
End: 2021-11-10
Payer: COMMERCIAL

## 2021-11-10 ENCOUNTER — OFFICE VISIT (OUTPATIENT)
Dept: PHYSICAL THERAPY | Facility: REHABILITATION | Age: 66
End: 2021-11-10
Payer: COMMERCIAL

## 2021-11-10 DIAGNOSIS — M62.89 PELVIC FLOOR DYSFUNCTION: Primary | ICD-10-CM

## 2021-11-10 DIAGNOSIS — R10.2 PELVIC PAIN: ICD-10-CM

## 2021-11-10 PROCEDURE — 97140 MANUAL THERAPY 1/> REGIONS: CPT | Performed by: PHYSICAL THERAPIST

## 2021-11-10 PROCEDURE — 97110 THERAPEUTIC EXERCISES: CPT | Performed by: PHYSICAL THERAPIST

## 2021-11-16 ENCOUNTER — APPOINTMENT (OUTPATIENT)
Dept: PHYSICAL THERAPY | Facility: REHABILITATION | Age: 66
End: 2021-11-16
Payer: COMMERCIAL

## 2021-11-16 ENCOUNTER — OFFICE VISIT (OUTPATIENT)
Dept: OBGYN CLINIC | Facility: MEDICAL CENTER | Age: 66
End: 2021-11-16
Payer: COMMERCIAL

## 2021-11-16 VITALS
BODY MASS INDEX: 26.21 KG/M2 | HEIGHT: 67 IN | WEIGHT: 167 LBS | DIASTOLIC BLOOD PRESSURE: 90 MMHG | SYSTOLIC BLOOD PRESSURE: 140 MMHG

## 2021-11-16 DIAGNOSIS — R10.2 PELVIC PAIN: Primary | ICD-10-CM

## 2021-11-16 PROCEDURE — 99213 OFFICE O/P EST LOW 20 MIN: CPT | Performed by: OBSTETRICS & GYNECOLOGY

## 2021-11-18 ENCOUNTER — HOSPITAL ENCOUNTER (OUTPATIENT)
Dept: ULTRASOUND IMAGING | Facility: HOSPITAL | Age: 66
Discharge: HOME | End: 2021-11-18
Attending: OBSTETRICS & GYNECOLOGY
Payer: COMMERCIAL

## 2021-11-18 DIAGNOSIS — R10.2 PELVIC PAIN: ICD-10-CM

## 2021-11-18 PROCEDURE — 76830 TRANSVAGINAL US NON-OB: CPT

## 2021-11-18 PROCEDURE — 76856 US EXAM PELVIC COMPLETE: CPT

## 2021-11-22 ENCOUNTER — APPOINTMENT (OUTPATIENT)
Dept: PHYSICAL THERAPY | Facility: REHABILITATION | Age: 66
End: 2021-11-22
Payer: COMMERCIAL

## 2021-11-24 ENCOUNTER — TELEPHONE (OUTPATIENT)
Dept: OBGYN CLINIC | Facility: MEDICAL CENTER | Age: 66
End: 2021-11-24

## 2021-11-30 ENCOUNTER — APPOINTMENT (OUTPATIENT)
Dept: PHYSICAL THERAPY | Facility: REHABILITATION | Age: 66
End: 2021-11-30
Payer: COMMERCIAL

## 2021-11-30 ENCOUNTER — OFFICE VISIT (OUTPATIENT)
Dept: PHYSICAL THERAPY | Facility: REHABILITATION | Age: 66
End: 2021-11-30
Payer: COMMERCIAL

## 2021-11-30 DIAGNOSIS — M62.89 PELVIC FLOOR DYSFUNCTION: Primary | ICD-10-CM

## 2021-11-30 DIAGNOSIS — R10.2 PELVIC PAIN: ICD-10-CM

## 2021-11-30 PROCEDURE — 97530 THERAPEUTIC ACTIVITIES: CPT | Performed by: PHYSICAL THERAPIST

## 2021-11-30 PROCEDURE — 97110 THERAPEUTIC EXERCISES: CPT | Performed by: PHYSICAL THERAPIST

## 2021-12-09 ENCOUNTER — TELEPHONE (OUTPATIENT)
Dept: OBGYN CLINIC | Facility: MEDICAL CENTER | Age: 66
End: 2021-12-09

## 2021-12-14 ENCOUNTER — IMMUNIZATIONS (OUTPATIENT)
Dept: FAMILY MEDICINE CLINIC | Facility: HOSPITAL | Age: 66
End: 2021-12-14

## 2021-12-14 DIAGNOSIS — Z23 ENCOUNTER FOR IMMUNIZATION: Primary | ICD-10-CM

## 2021-12-14 PROCEDURE — 0001A COVID-19 PFIZER VACC 0.3 ML: CPT

## 2021-12-14 PROCEDURE — 91300 COVID-19 PFIZER VACC 0.3 ML: CPT

## 2022-06-20 ENCOUNTER — OFFICE VISIT (OUTPATIENT)
Dept: URGENT CARE | Facility: MEDICAL CENTER | Age: 67
End: 2022-06-20
Payer: COMMERCIAL

## 2022-06-20 VITALS
SYSTOLIC BLOOD PRESSURE: 138 MMHG | HEART RATE: 84 BPM | OXYGEN SATURATION: 97 % | TEMPERATURE: 96.9 F | RESPIRATION RATE: 18 BRPM | DIASTOLIC BLOOD PRESSURE: 82 MMHG

## 2022-06-20 DIAGNOSIS — J01.40 ACUTE PANSINUSITIS, RECURRENCE NOT SPECIFIED: Primary | ICD-10-CM

## 2022-06-20 DIAGNOSIS — J20.9 ACUTE BRONCHITIS, UNSPECIFIED ORGANISM: ICD-10-CM

## 2022-06-20 PROCEDURE — 99212 OFFICE O/P EST SF 10 MIN: CPT | Performed by: NURSE PRACTITIONER

## 2022-06-20 RX ORDER — PREDNISONE 20 MG/1
20 TABLET ORAL 2 TIMES DAILY WITH MEALS
Qty: 10 TABLET | Refills: 0 | Status: SHIPPED | OUTPATIENT
Start: 2022-06-20 | End: 2022-06-25

## 2022-06-20 RX ORDER — ALBUTEROL SULFATE 90 UG/1
2 AEROSOL, METERED RESPIRATORY (INHALATION) EVERY 4 HOURS PRN
Qty: 8.5 G | Refills: 1 | Status: SHIPPED | OUTPATIENT
Start: 2022-06-20

## 2022-06-20 RX ORDER — BENZONATATE 100 MG/1
CAPSULE ORAL
Qty: 30 CAPSULE | Refills: 0 | Status: SHIPPED | OUTPATIENT
Start: 2022-06-20

## 2022-06-20 RX ORDER — AZITHROMYCIN 250 MG/1
TABLET, FILM COATED ORAL
Qty: 6 TABLET | Refills: 0 | Status: SHIPPED | OUTPATIENT
Start: 2022-06-20 | End: 2022-06-24

## 2022-06-20 NOTE — PROGRESS NOTES
[facility]        NAME: Ricky Perez is a 61 y o  female  : 1955    MRN: [id number]  DATE: 2022  TIME: 12:47 PM      Assessment and Plan     Acute pansinusitis, recurrence not specified [J01 [id number]]  1  Acute pansinusitis, recurrence not specified  azithromycin (ZITHROMAX) 250 mg tablet    predniSONE 20 mg tablet   2  Acute bronchitis, unspecified organism  azithromycin (ZITHROMAX) 250 mg tablet    predniSONE 20 mg tablet    albuterol (ProAir HFA) 90 mcg/act inhaler    benzonatate (TESSALON PERLES) 100 mg capsule         Patient Instructions     Patient Instructions     Take the azithromycin (antibiotic) and prednisone (steroid) as ordered until completed  Use the albuterol inhaler every 4-6 hours as needed for shortness of breath, chest tightness, wheezing or persistent cough  Use the Tessalon Perles up to 3x/day as needed for cough  Eat yogurt or take a probiotic to restore good bacteria to your gut; this helps prevent stomach irritation/diarrhea while on an antibiotic  Sinusitis   AMBULATORY CARE:   Sinusitis  is inflammation or infection of your sinuses  Sinusitis is most often caused by a virus  Acute sinusitis may last up to 12 weeks  Chronic sinusitis lasts longer than 12 weeks  Recurrent sinusitis means you have 4 or more infections in 1 year  Common signs and symptoms:   · Fever    · Pain, pressure, redness, or swelling around the forehead, cheeks, or eyes    · Thick yellow or green discharge from your nose    · Tenderness when you touch your face over your sinuses    · Dry cough that happens mostly at night or when you lie down    · Headache and face pain that is worse when you lean forward    · Tooth pain, or pain when you chew    Seek care immediately if:   · You have trouble breathing or wheezing that is getting worse  · You have a stiff neck, a fever, or a bad headache  · You cannot open your eye  · Your eyeball bulges out or you cannot move your eye  · You are more sleepy than normal, or you notice changes in your ability to think, move, or talk  · You have swelling of your forehead or scalp  Call your doctor if:   · You have vision changes, such as double vision  · Your eye and eyelid are red, swollen, and painful  · Your symptoms do not improve or go away after 10 days  · You have nausea and are vomiting  · Your nose is bleeding  · You have questions or concerns about your condition or care  Medicines: Your symptoms may go away on their own  Your healthcare provider may recommend watchful waiting for up to 10 days before starting antibiotics  You may need any of the following:  · Acetaminophen  decreases pain and fever  It is available without a doctor's order  Ask how much to take and how often to take it  Follow directions  Read the labels of all other medicines you are using to see if they also contain acetaminophen, or ask your doctor or pharmacist  Acetaminophen can cause liver damage if not taken correctly  Do not use more than 4 grams (4,000 milligrams) total of acetaminophen in one day  · NSAIDs , such as ibuprofen, help decrease swelling, pain, and fever  This medicine is available with or without a doctor's order  NSAIDs can cause stomach bleeding or kidney problems in certain people  If you take blood thinner medicine, always ask your healthcare provider if NSAIDs are safe for you  Always read the medicine label and follow directions  · Nasal steroid sprays  may help decrease inflammation in your nose and sinuses  · Decongestants  help reduce swelling and drain mucus in the nose and sinuses  They may help you breathe easier  · Antihistamines  help dry mucus in the nose and relieve sneezing  · Antibiotics  help treat or prevent a bacterial infection  Self-care:   · Rinse your sinuses as directed    Use a sinus rinse device to rinse your nasal passages with a saline (salt water) solution or distilled water  Do not use tap water  This will help thin the mucus in your nose and rinse away pollen and dirt  It will also help reduce swelling so you can breathe normally  · Use a humidifier  to increase air moisture in your home  This may make it easier for you to breathe and help decrease your cough  · Sleep with your head elevated  Place an extra pillow under your head before you go to sleep to help your sinuses drain  · Drink liquids as directed  Ask your healthcare provider how much liquid to drink each day and which liquids are best for you  Liquids will thin the mucus in your nose and help it drain  Avoid drinks that contain alcohol or caffeine  · Do not smoke, and avoid secondhand smoke  Nicotine and other chemicals in cigarettes and cigars can make your symptoms worse  Ask your healthcare provider for information if you currently smoke and need help to quit  E-cigarettes or smokeless tobacco still contain nicotine  Talk to your healthcare provider before you use these products  Prevent the spread of germs:   · Wash your hands often with soap and water  Wash your hands after you use the bathroom, change a child's diaper, or sneeze  Wash your hands before you prepare or eat food  · Stay away from people who are sick  Some germs spread easily and quickly through contact  Follow up with your doctor as directed: You may be referred to an ear, nose, and throat specialist  Write down your questions so you remember to ask them during your visits  © Copyright [employer] 2022 Information is for End User's use only and may not be sold, redistributed or otherwise used for commercial purposes  All illustrations and images included in CareNotes® are the copyrighted property of [employer]  or [facility] [address]   The above information is an  only  It is not intended as medical advice for individual conditions or treatments   Talk to your doctor, nurse or pharmacist before following any medical regimen to see if it is safe and effective for you  Acute Bronchitis   AMBULATORY CARE:   Acute bronchitis  is swelling and irritation in your lungs  It is usually caused by a virus and most often happens in the winter  Bronchitis may also be caused by bacteria or by a chemical irritant, such as smoke  Common symptoms include the following:   · Cough that lasts up to 3 weeks, stuffy nose    · Hoarseness, sore throat    · A fever and chills    · Feeling more tired than usual, and body aches    · Wheezing or pain when you breathe or cough    Seek care immediately if:   · You cough up blood  · Your lips or fingernails turn blue  · You feel like you are not getting enough air when you breathe  Call your doctor if:   · Your symptoms do not go away or get worse, even after treatment  · Your cough does not get better within 4 weeks  · You have questions or concerns about your condition or care  Medicines: You may  need any of the following:  · Cough suppressants  decrease your urge to cough  · Decongestants  help loosen mucus in your lungs and make it easier to cough up  This can help you breathe easier  · Inhalers  may be given  Your healthcare provider may give you one or more inhalers to help you breathe easier and cough less  An inhaler gives your medicine to open your airways  Ask your healthcare provider to show you how to use your inhaler correctly  · Antibiotics  may be given for up to 5 days if your bronchitis is caused by bacteria  · Acetaminophen  decreases pain and fever  It is available without a doctor's order  Ask how much to take and how often to take it  Follow directions  Read the labels of all other medicines you are using to see if they also contain acetaminophen, or ask your doctor or pharmacist  Acetaminophen can cause liver damage if not taken correctly   Do not use more than 4 grams (4,000 milligrams) total of acetaminophen in one day      · NSAIDs  help decrease swelling and pain or fever  This medicine is available with or without a doctor's order  NSAIDs can cause stomach bleeding or kidney problems in certain people  If you take blood thinner medicine, always ask your healthcare provider if NSAIDs are safe for you  Always read the medicine label and follow directions  · Take your medicine as directed  Contact your healthcare provider if you think your medicine is not helping or if you have side effects  Tell him of her if you are allergic to any medicine  Keep a list of the medicines, vitamins, and herbs you take  Include the amounts, and when and why you take them  Bring the list or the pill bottles to follow-up visits  Carry your medicine list with you in case of an emergency  Self-care:   · Drink liquids as directed  You may need to drink more liquids than usual to stay hydrated  Ask how much liquid to drink each day and which liquids are best for you  · Use a cool mist humidifier  to increase air moisture in your home  This may make it easier for you to breathe and help decrease your cough  · Get more rest   Rest helps your body to heal  Slowly start to do more each day  Rest when you feel it is needed  · Avoid irritants in the air  Avoid chemicals, fumes, and dust  Wear a face mask if you must work around dust or fumes  Stay inside on days when air pollution levels are high  If you have allergies, stay inside when pollen counts are high  Do not use aerosol products, such as spray-on deodorant, bug spray, and hair spray  · Do not smoke or be around others who are smoking  Nicotine and other chemicals in cigarettes and cigars can cause lung damage  Ask your healthcare provider for information if you currently smoke and need help to quit  E-cigarettes or smokeless tobacco still contain nicotine  Talk to your healthcare provider before you use these products  Prevent acute bronchitis:       1   Ask about vaccines you may need  Get a flu vaccine each year as soon as recommended, usually in September or October  Ask your healthcare provider if you should also get a pneumonia or COVID-19 vaccine  Your healthcare provider can tell you if you should also get other vaccines, and when to get them  2  Prevent the spread of germs  You can decrease your risk for acute bronchitis and other illnesses by doing the following:    ? Wash your hands often with soap and water  Carry germ-killing hand lotion or gel with you  You can use the lotion or gel to clean your hands when soap and water are not available  ? Do not touch your eyes, nose, or mouth unless you have washed your hands first     ? Always cover your mouth when you cough to prevent the spread of germs  It is best to cough into a tissue or your shirt sleeve instead of into your hand  Ask those around you to cover their mouths when they cough  ? Try to avoid people who have a cold or the flu  If you are sick, stay away from others as much as possible  Follow up with your doctor as directed:  Write down questions you have so you will remember to ask them during your follow-up visits  © Copyright [employer] 2022 Information is for End User's use only and may not be sold, redistributed or otherwise used for commercial purposes  All illustrations and images included in CareNotes® are the copyrighted property of [employer]  or [employer]   The above information is an  only  It is not intended as medical advice for individual conditions or treatments  Talk to your doctor, nurse or pharmacist before following any medical regimen to see if it is safe and effective for you  Follow up with PCP in 3-5 days  Proceed to  ER if symptoms worsen  Chief Complaint     Chief Complaint   Patient presents with    Cold Like Symptoms     Patient c/o cough, SOB, post nasal drip, chest pain with coughing, and chest tightness x 1 week   Patient tested negative on a at Covid test a few day ago  History of Present Illness     Took a covid test initially with negative results  States symptoms started after bringing Sun into the house  Notes a hx of sinusitis and having it move down into her chest   Has used an inhaler in the past; does not currently have one  Has been sick for over a week  Has been taking over the counter medications  Mucous was initially clear--now it is dark yellow  Cough is tight, nonproductive; she feels SOB when coughing  Review of Systems     Review of Systems   Constitutional: Positive for fatigue  Negative for chills and fever  HENT: Positive for congestion, postnasal drip, sinus pressure and sinus pain  Respiratory: Positive for cough, chest tightness and shortness of breath  Musculoskeletal: Positive for myalgias (notes chest and abdomen hurt from coughint)  Neurological: Positive for headaches  All other systems reviewed and are negative          Current Medications       Current Outpatient Medications:     albuterol (ProAir HFA) 90 mcg/act inhaler, Inhale 2 puffs every 4 (four) hours as needed for wheezing or shortness of breath, Disp: 8 5 g, Rfl: 1    azithromycin (ZITHROMAX) 250 mg tablet, Take 2 tablets today then 1 tablet daily x 4 days, Disp: 6 tablet, Rfl: 0    benzonatate (TESSALON PERLES) 100 mg capsule, 1-2 caps every 8 hours as needed for cough, Disp: 30 capsule, Rfl: 0    predniSONE 20 mg tablet, Take 1 tablet (20 mg total) by mouth 2 (two) times a day with meals for 5 days, Disp: 10 tablet, Rfl: 0    Cholecalciferol 50 MCG (2000 UT) CAPS, Take 1 capsule by mouth, Disp: , Rfl:     Multiple Vitamins-Minerals (PRESERVISION AREDS 2+MULTI VIT PO), One twice a day , Disp: , Rfl:     Current Allergies     Allergies as of 06/20/2022 - Reviewed 06/20/2022   Allergen Reaction Noted    Amoxicillin Rash and Shortness Of Breath 12/11/2012    Ciprofloxacin Myalgia 07/11/2013    Darvon [propoxyphene] Dizziness 12/11/2012              The following portions of the patient's history were reviewed and updated as appropriate: allergies, current medications, past family history, past medical history, past social history, past surgical history and problem list      Past Medical History:   Diagnosis Date    Brachial plexus injury 1982    with permanent nerve damage     Endometrial hyperplasia, unspecified     resolved 11/26/2014    Plantar fasciitis     Tachycardia     intermittent had full cardiac workup-benign    Uterine cyst 2014    removed       Past Surgical History:   Procedure Laterality Date    COLONOSCOPY  07/15/2015    normal    CYSTOSCOPY  12/15/2014    last assesed 12/15/2014, managed by Kacie Perez       Family History   Problem Relation Age of Onset    Colon cancer Mother     No Known Problems Father     No Known Problems Sister     No Known Problems Brother     Breast cancer Family     Ovarian cancer Family     Colon cancer Maternal Uncle     Substance Abuse Neg Hx     Mental illness Neg Hx          Medications have been verified  Objective     /82   Pulse 84   Temp (!) 96 9 °F (36 1 °C)   Resp 18   LMP  (LMP Unknown)   SpO2 97%   No LMP recorded (lmp unknown)  Patient is postmenopausal          Physical Exam     Physical Exam  Vitals and nursing note reviewed  Constitutional:       General: She is not in acute distress  Appearance: Normal appearance  She is well-developed and well-groomed  She is ill-appearing  She is not toxic-appearing or diaphoretic  HENT:      Head: Normocephalic and atraumatic  Nose: Mucosal edema and congestion present  Right Sinus: Maxillary sinus tenderness and frontal sinus tenderness present  Left Sinus: Maxillary sinus tenderness and frontal sinus tenderness present  Eyes:      Pupils: Pupils are equal, round, and reactive to light     Cardiovascular:      Rate and Rhythm: Normal rate and regular rhythm  Heart sounds: Normal heart sounds, S1 normal and S2 normal  No murmur heard  No friction rub  No gallop  Pulmonary:      Effort: Pulmonary effort is normal  No tachypnea, bradypnea, accessory muscle usage, prolonged expiration, respiratory distress or retractions  Breath sounds: Normal air entry  No stridor or decreased air movement  Wheezing (mild-mod I&E wheezes throughout) present  No decreased breath sounds, rhonchi or rales  Abdominal:      General: There is no distension  Palpations: Abdomen is soft  Tenderness: There is no abdominal tenderness  Musculoskeletal:         General: Normal range of motion  Cervical back: Normal range of motion and neck supple  Lymphadenopathy:      Cervical: Cervical adenopathy present  Skin:     General: Skin is warm and dry  Capillary Refill: Capillary refill takes less than 2 seconds  Neurological:      General: No focal deficit present  Mental Status: She is alert and oriented to person, place, and time  Psychiatric:         Mood and Affect: Mood normal          Behavior: Behavior normal  Behavior is cooperative  Thought Content:  Thought content normal          Judgment: Judgment normal

## 2022-06-20 NOTE — PATIENT INSTRUCTIONS
Take the azithromycin (antibiotic) and prednisone (steroid) as ordered until completed  Use the albuterol inhaler every 4-6 hours as needed for shortness of breath, chest tightness, wheezing or persistent cough  Use the Tessalon Perles up to 3x/day as needed for cough  Eat yogurt or take a probiotic to restore good bacteria to your gut; this helps prevent stomach irritation/diarrhea while on an antibiotic  Sinusitis   AMBULATORY CARE:   Sinusitis  is inflammation or infection of your sinuses  Sinusitis is most often caused by a virus  Acute sinusitis may last up to 12 weeks  Chronic sinusitis lasts longer than 12 weeks  Recurrent sinusitis means you have 4 or more infections in 1 year  Common signs and symptoms:   Fever    Pain, pressure, redness, or swelling around the forehead, cheeks, or eyes    Thick yellow or green discharge from your nose    Tenderness when you touch your face over your sinuses    Dry cough that happens mostly at night or when you lie down    Headache and face pain that is worse when you lean forward    Tooth pain, or pain when you chew    Seek care immediately if:   You have trouble breathing or wheezing that is getting worse  You have a stiff neck, a fever, or a bad headache  You cannot open your eye  Your eyeball bulges out or you cannot move your eye  You are more sleepy than normal, or you notice changes in your ability to think, move, or talk  You have swelling of your forehead or scalp  Call your doctor if:   You have vision changes, such as double vision  Your eye and eyelid are red, swollen, and painful  Your symptoms do not improve or go away after 10 days  You have nausea and are vomiting  Your nose is bleeding  You have questions or concerns about your condition or care  Medicines: Your symptoms may go away on their own  Your healthcare provider may recommend watchful waiting for up to 10 days before starting antibiotics   You may need any of the following:  Acetaminophen  decreases pain and fever  It is available without a doctor's order  Ask how much to take and how often to take it  Follow directions  Read the labels of all other medicines you are using to see if they also contain acetaminophen, or ask your doctor or pharmacist  Acetaminophen can cause liver damage if not taken correctly  Do not use more than 4 grams (4,000 milligrams) total of acetaminophen in one day  NSAIDs , such as ibuprofen, help decrease swelling, pain, and fever  This medicine is available with or without a doctor's order  NSAIDs can cause stomach bleeding or kidney problems in certain people  If you take blood thinner medicine, always ask your healthcare provider if NSAIDs are safe for you  Always read the medicine label and follow directions  Nasal steroid sprays  may help decrease inflammation in your nose and sinuses  Decongestants  help reduce swelling and drain mucus in the nose and sinuses  They may help you breathe easier  Antihistamines  help dry mucus in the nose and relieve sneezing  Antibiotics  help treat or prevent a bacterial infection  Self-care:   Rinse your sinuses as directed  Use a sinus rinse device to rinse your nasal passages with a saline (salt water) solution or distilled water  Do not use tap water  This will help thin the mucus in your nose and rinse away pollen and dirt  It will also help reduce swelling so you can breathe normally  Use a humidifier  to increase air moisture in your home  This may make it easier for you to breathe and help decrease your cough  Sleep with your head elevated  Place an extra pillow under your head before you go to sleep to help your sinuses drain  Drink liquids as directed  Ask your healthcare provider how much liquid to drink each day and which liquids are best for you  Liquids will thin the mucus in your nose and help it drain   Avoid drinks that contain alcohol or caffeine  Do not smoke, and avoid secondhand smoke  Nicotine and other chemicals in cigarettes and cigars can make your symptoms worse  Ask your healthcare provider for information if you currently smoke and need help to quit  E-cigarettes or smokeless tobacco still contain nicotine  Talk to your healthcare provider before you use these products  Prevent the spread of germs:   Wash your hands often with soap and water  Wash your hands after you use the bathroom, change a child's diaper, or sneeze  Wash your hands before you prepare or eat food  Stay away from people who are sick  Some germs spread easily and quickly through contact  Follow up with your doctor as directed: You may be referred to an ear, nose, and throat specialist  Write down your questions so you remember to ask them during your visits  © Copyright [employer] 2022 Information is for End User's use only and may not be sold, redistributed or otherwise used for commercial purposes  All illustrations and images included in CareNotes® are the copyrighted property of A D A M , Inc  or [facility] [employer]   The above information is an  only  It is not intended as medical advice for individual conditions or treatments  Talk to your doctor, nurse or pharmacist before following any medical regimen to see if it is safe and effective for you  Acute Bronchitis   AMBULATORY CARE:   Acute bronchitis  is swelling and irritation in your lungs  It is usually caused by a virus and most often happens in the winter  Bronchitis may also be caused by bacteria or by a chemical irritant, such as smoke  Common symptoms include the following:   Cough that lasts up to 3 weeks, stuffy nose    Hoarseness, sore throat    A fever and chills    Feeling more tired than usual, and body aches    Wheezing or pain when you breathe or cough    Seek care immediately if:   You cough up blood  Your lips or fingernails turn blue      You feel like you are not getting enough air when you breathe  Call your doctor if:   Your symptoms do not go away or get worse, even after treatment  Your cough does not get better within 4 weeks  You have questions or concerns about your condition or care  Medicines: You may  need any of the following:  Cough suppressants  decrease your urge to cough  Decongestants  help loosen mucus in your lungs and make it easier to cough up  This can help you breathe easier  Inhalers  may be given  Your healthcare provider may give you one or more inhalers to help you breathe easier and cough less  An inhaler gives your medicine to open your airways  Ask your healthcare provider to show you how to use your inhaler correctly  Antibiotics  may be given for up to 5 days if your bronchitis is caused by bacteria  Acetaminophen  decreases pain and fever  It is available without a doctor's order  Ask how much to take and how often to take it  Follow directions  Read the labels of all other medicines you are using to see if they also contain acetaminophen, or ask your doctor or pharmacist  Acetaminophen can cause liver damage if not taken correctly  Do not use more than 4 grams (4,000 milligrams) total of acetaminophen in one day  NSAIDs  help decrease swelling and pain or fever  This medicine is available with or without a doctor's order  NSAIDs can cause stomach bleeding or kidney problems in certain people  If you take blood thinner medicine, always ask your healthcare provider if NSAIDs are safe for you  Always read the medicine label and follow directions  Take your medicine as directed  Contact your healthcare provider if you think your medicine is not helping or if you have side effects  Tell him of her if you are allergic to any medicine  Keep a list of the medicines, vitamins, and herbs you take  Include the amounts, and when and why you take them   Bring the list or the pill bottles to follow-up visits  Carry your medicine list with you in case of an emergency  Self-care:   Drink liquids as directed  You may need to drink more liquids than usual to stay hydrated  Ask how much liquid to drink each day and which liquids are best for you  Use a cool mist humidifier  to increase air moisture in your home  This may make it easier for you to breathe and help decrease your cough  Get more rest   Rest helps your body to heal  Slowly start to do more each day  Rest when you feel it is needed  Avoid irritants in the air  Avoid chemicals, fumes, and dust  Wear a face mask if you must work around dust or fumes  Stay inside on days when air pollution levels are high  If you have allergies, stay inside when pollen counts are high  Do not use aerosol products, such as spray-on deodorant, bug spray, and hair spray  Do not smoke or be around others who are smoking  Nicotine and other chemicals in cigarettes and cigars can cause lung damage  Ask your healthcare provider for information if you currently smoke and need help to quit  E-cigarettes or smokeless tobacco still contain nicotine  Talk to your healthcare provider before you use these products  Prevent acute bronchitis:       Ask about vaccines you may need  Get a flu vaccine each year as soon as recommended, usually in September or October  Ask your healthcare provider if you should also get a pneumonia or COVID-19 vaccine  Your healthcare provider can tell you if you should also get other vaccines, and when to get them  Prevent the spread of germs  You can decrease your risk for acute bronchitis and other illnesses by doing the following:    Wash your hands often with soap and water  Carry germ-killing hand lotion or gel with you  You can use the lotion or gel to clean your hands when soap and water are not available           Do not touch your eyes, nose, or mouth unless you have washed your hands first     Always cover your mouth when you cough to prevent the spread of germs  It is best to cough into a tissue or your shirt sleeve instead of into your hand  Ask those around you to cover their mouths when they cough  Try to avoid people who have a cold or the flu  If you are sick, stay away from others as much as possible  Follow up with your doctor as directed:  Write down questions you have so you will remember to ask them during your follow-up visits  © Copyright [employer] 2022 Information is for End User's use only and may not be sold, redistributed or otherwise used for commercial purposes  All illustrations and images included in CareNotes® are the copyrighted property of A D A M , Inc  or [facility] [employer]   The above information is an  only  It is not intended as medical advice for individual conditions or treatments  Talk to your doctor, nurse or pharmacist before following any medical regimen to see if it is safe and effective for you

## 2023-01-05 ENCOUNTER — APPOINTMENT (OUTPATIENT)
Dept: RADIOLOGY | Facility: CLINIC | Age: 68
End: 2023-01-05

## 2023-01-05 DIAGNOSIS — M79.676 PAIN OF FIFTH TOE: Primary | ICD-10-CM

## 2023-01-05 DIAGNOSIS — M79.676 PAIN OF FIFTH TOE: ICD-10-CM

## 2023-06-12 ENCOUNTER — OFFICE VISIT (OUTPATIENT)
Dept: URGENT CARE | Facility: CLINIC | Age: 68
End: 2023-06-12
Payer: COMMERCIAL

## 2023-06-12 VITALS
TEMPERATURE: 97.5 F | SYSTOLIC BLOOD PRESSURE: 130 MMHG | OXYGEN SATURATION: 98 % | WEIGHT: 170 LBS | BODY MASS INDEX: 26.68 KG/M2 | HEIGHT: 67 IN | DIASTOLIC BLOOD PRESSURE: 80 MMHG | HEART RATE: 74 BPM | RESPIRATION RATE: 16 BRPM

## 2023-06-12 DIAGNOSIS — S90.512A ABRASION OF LEFT ANKLE, INITIAL ENCOUNTER: Primary | ICD-10-CM

## 2023-06-12 PROCEDURE — 99203 OFFICE O/P NEW LOW 30 MIN: CPT | Performed by: PHYSICIAN ASSISTANT

## 2023-06-12 RX ORDER — CLINDAMYCIN HYDROCHLORIDE 150 MG/1
150 CAPSULE ORAL EVERY 8 HOURS SCHEDULED
Qty: 21 CAPSULE | Refills: 0 | Status: SHIPPED | OUTPATIENT
Start: 2023-06-12 | End: 2023-06-19

## 2023-06-12 NOTE — PROGRESS NOTES
[facility]        NAME: Cassi Hall is a 74 y o  female  : 1955    MRN: [id number]  DATE: 2023  TIME: 1:31 PM    Assessment and Plan   Abrasion of left ankle, initial encounter [S90 512A]  1  Abrasion of left ankle, initial encounter  clindamycin (CLEOCIN) 150 mg capsule            Patient Instructions     Patient was educated on infection of left ankle  Patient was educated on antibiotics  Patient was told to eat on antibiotics  Any increased redness, purulent discharge or high grade fever go to ED  Chief Complaint     Chief Complaint   Patient presents with   • Abrasion     On left lateral ankle from 10 days ago  Started to drain and pain increased over weekend  Using triple antibiotic ointment  History of Present Illness       Patient is here today complaining of abrasion on left lateral ankle  Patient reports abrasion was caused by a rope burn 10 days ago  Patient has tried OTC neosporin with no relief  Patient has allergies to Amoxicillin, ciprofloaxin and Darvon      Review of Systems   Review of Systems   Constitutional: Negative  Respiratory: Negative  Cardiovascular: Negative  Skin:        Abrasion on lateral left ankle with some swelling and redness   Psychiatric/Behavioral: Negative            Current Medications       Current Outpatient Medications:   •  albuterol (ProAir HFA) 90 mcg/act inhaler, Inhale 2 puffs every 4 (four) hours as needed for wheezing or shortness of breath, Disp: 8 5 g, Rfl: 1  •  Cholecalciferol 50 MCG (2000 UT) CAPS, Take 1 capsule by mouth, Disp: , Rfl:   •  clindamycin (CLEOCIN) 150 mg capsule, Take 1 capsule (150 mg total) by mouth every 8 (eight) hours for 7 days, Disp: 21 capsule, Rfl: 0  •  Multiple Vitamins-Minerals (PRESERVISION AREDS 2+MULTI VIT PO), One twice a day , Disp: , Rfl:   •  benzonatate (TESSALON PERLES) 100 mg capsule, 1-2 caps every 8 hours as needed for cough (Patient not taking: Reported on 2023), Disp: "30 capsule, Rfl: 0    Current Allergies     Allergies as of 06/12/2023 - Reviewed 06/12/2023   Allergen Reaction Noted   • Amoxicillin Rash and Shortness Of Breath 12/11/2012   • Ciprofloxacin Myalgia 07/11/2013   • Darvon [propoxyphene] Dizziness 12/11/2012            The following portions of the patient's history were reviewed and updated as appropriate: allergies, current medications, past family history, past medical history, past social history, past surgical history and problem list      Past Medical History:   Diagnosis Date   • Brachial plexus injury 1982    with permanent nerve damage    • Endometrial hyperplasia, unspecified     resolved 11/26/2014   • Plantar fasciitis    • Tachycardia     intermittent had full cardiac workup-benign   • Uterine cyst 2014    removed       Past Surgical History:   Procedure Laterality Date   • COLONOSCOPY  07/15/2015    normal   • CYSTOSCOPY  12/15/2014    last assesed 12/15/2014, managed by Amara Sands       Family History   Problem Relation Age of Onset   • Colon cancer Mother    • No Known Problems Father    • No Known Problems Sister    • No Known Problems Brother    • Colon cancer Maternal Uncle    • Breast cancer Family    • Ovarian cancer Family    • Substance Abuse Neg Hx    • Mental illness Neg Hx          Medications have been verified  Objective   /80   Pulse 74   Temp 97 5 °F (36 4 °C)   Resp 16   Ht 5' 7\" (1 702 m)   Wt 77 1 kg (170 lb)   LMP  (LMP Unknown)   SpO2 98%   BMI 26 63 kg/m²   No LMP recorded (lmp unknown)  Patient is postmenopausal        Physical Exam     Physical Exam  Vitals and nursing note reviewed  Constitutional:       Appearance: Normal appearance  HENT:      Head: Normocephalic  Cardiovascular:      Rate and Rhythm: Normal rate and regular rhythm  Heart sounds: Normal heart sounds  Pulmonary:      Breath sounds: Normal breath sounds  Skin:     Comments: NO pain in left calf   Abrasion with redness " and swelling in left ankle  Neurological:      General: No focal deficit present  Mental Status: She is alert and oriented to person, place, and time     Psychiatric:         Mood and Affect: Mood normal          Behavior: Behavior normal

## 2023-06-12 NOTE — PATIENT INSTRUCTIONS
Patient was educated on infection of left ankle  Patient was educated on antibiotics  Patient was told to eat on antibiotics  Any increased redness, purulent discharge or high grade fever go to ED    Abrasion   WHAT YOU NEED TO KNOW:   An abrasion is a wound on your skin  Abrasions usually happen when your skin rubs against a rough surface  Examples of an abrasion include rug burn, a skinned elbow, or road rash  Abrasions can be deep or shallow The wound may hurt, bleed, bruise, or swell  DISCHARGE INSTRUCTIONS:   Return to the emergency department if:   The bleeding does not stop after 10 minutes of firm pressure  The redness around your wound begins to spread  You cannot rinse one or more foreign objects out of your wound  Call your doctor if:   You have a fever or chills  Your abrasion is red, warm, swollen, or draining pus  You have questions or concerns about your condition or care  Care for your abrasion:   Wash your hands and dry them with a clean towel before first     Press a clean cloth against your wound for 5 to 10 minutes to stop any bleeding  Rinse your wound with clean water  Do not use harsh soap, alcohol, or iodine solutions  Use a clean, wet cloth to remove any objects, such as small pieces of rocks or dirt  Rub antibiotic ointment on your wound  This may help prevent infection and help your wound heal     Cover the wound with a non-stick bandage  Change the bandage daily, and if it gets wet or dirty  Follow up with your doctor as directed:  Write down your questions so you remember to ask them during your visits  © Copyright [employer] 2022 Information is for End User's use only and may not be sold, redistributed or otherwise used for commercial purposes  The above information is an  only  It is not intended as medical advice for individual conditions or treatments   Talk to your doctor, nurse or pharmacist before following any medical regimen to see if it is safe and effective for you

## 2023-09-12 ENCOUNTER — OFFICE VISIT (OUTPATIENT)
Dept: FAMILY MEDICINE CLINIC | Facility: CLINIC | Age: 68
End: 2023-09-12
Payer: COMMERCIAL

## 2023-09-12 VITALS
BODY MASS INDEX: 27.13 KG/M2 | WEIGHT: 168.8 LBS | SYSTOLIC BLOOD PRESSURE: 132 MMHG | HEIGHT: 66 IN | HEART RATE: 78 BPM | TEMPERATURE: 97.6 F | DIASTOLIC BLOOD PRESSURE: 82 MMHG | RESPIRATION RATE: 16 BRPM

## 2023-09-12 DIAGNOSIS — R63.1 POLYDIPSIA: ICD-10-CM

## 2023-09-12 DIAGNOSIS — Z12.31 ENCOUNTER FOR SCREENING MAMMOGRAM FOR MALIGNANT NEOPLASM OF BREAST: ICD-10-CM

## 2023-09-12 DIAGNOSIS — Z13.89 SCREENING FOR BLOOD OR PROTEIN IN URINE: ICD-10-CM

## 2023-09-12 DIAGNOSIS — Z00.00 ANNUAL PHYSICAL EXAM: Primary | ICD-10-CM

## 2023-09-12 DIAGNOSIS — Z13.0 SCREENING FOR BLOOD DISEASE: ICD-10-CM

## 2023-09-12 DIAGNOSIS — Z13.228 SCREENING FOR METABOLIC DISORDER: ICD-10-CM

## 2023-09-12 DIAGNOSIS — Z13.29 SCREENING FOR THYROID DISORDER: ICD-10-CM

## 2023-09-12 DIAGNOSIS — R03.0 ELEVATED BLOOD PRESSURE READING: ICD-10-CM

## 2023-09-12 DIAGNOSIS — G95.0 SYRINGOMYELIA (HCC): ICD-10-CM

## 2023-09-12 DIAGNOSIS — Z13.220 SCREENING FOR LIPID DISORDERS: ICD-10-CM

## 2023-09-12 DIAGNOSIS — Z13.820 SCREENING FOR OSTEOPOROSIS: ICD-10-CM

## 2023-09-12 DIAGNOSIS — Z78.0 POSTMENOPAUSAL: ICD-10-CM

## 2023-09-12 DIAGNOSIS — E55.9 VITAMIN D DEFICIENCY: ICD-10-CM

## 2023-09-12 DIAGNOSIS — Z13.1 SCREENING FOR DIABETES MELLITUS: ICD-10-CM

## 2023-09-12 PROCEDURE — 99203 OFFICE O/P NEW LOW 30 MIN: CPT | Performed by: FAMILY MEDICINE

## 2023-09-12 PROCEDURE — 99387 INIT PM E/M NEW PAT 65+ YRS: CPT | Performed by: FAMILY MEDICINE

## 2023-09-12 RX ORDER — LORATADINE 10 MG/1
10 TABLET ORAL DAILY
COMMUNITY

## 2023-09-12 RX ORDER — VITAMIN B COMPLEX
1 CAPSULE ORAL DAILY
COMMUNITY
End: 2023-09-12

## 2023-09-12 NOTE — PROGRESS NOTES
Assessment/Plan:    Elevated blood pressure reading  - repeat /82, discussed low sodium diet and regular exercise, will obtain lab work  - CBC and differential; Future  - Comprehensive metabolic panel; Future  - TSH, 3rd generation with Free T4 reflex; Future  - UA (URINE) with reflex to Scope    Polydipsia  - will obtain lab work and call with results  - Comprehensive metabolic panel; Future  - Hemoglobin A1C; Future       Syringomyelia  - MRI thoracic spine wo contrast; Future      Return in 6 months or sooner as needed. The patient understands and agrees with the treatment plan. Subjective:   Chief Complaint   Patient presents with   • Establish Care     Returning pt   • Polydipsia   • Excessive Sweating     During the day mostly and sometimes at night       Patient ID: Ceasar Keller is a 74 y.o. female who presents today to re-establish care. She reports polydipsia for the past few months, no polyuria, no unexplained weight changes. Patient reports history of thoracic syrinx that was first noted in 2010 during work up for MS due to right lower leg and foot weakness. Patient was evaluated by Neurosurgery in April 2013 and advised periodic monitoring. Her last MRI thoracic spine in March 2013 showed stability. Patient denies neck or back pain, upper or lower extremities weakness or numbness, blurry vision or headache.          The following portions of the patient's history were reviewed and updated as appropriate: allergies, current medications, past family history, past medical history, past social history, past surgical history and problem list.    Past Medical History:   Diagnosis Date   • Brachial plexus injury 1982    with permanent nerve damage    • Endometrial hyperplasia, unspecified     resolved 11/26/2014   • Plantar fasciitis    • Tachycardia     intermittent had full cardiac workup-benign   • Uterine cyst 2014    removed     Past Surgical History:   Procedure Laterality Date   • COLONOSCOPY  07/15/2015    normal   • CYSTOSCOPY  12/15/2014    last assesed 12/15/2014, managed by Deatra Rater     Family History   Problem Relation Age of Onset   • Colon cancer Mother    • No Known Problems Father    • Hyperlipidemia Brother    • Colon cancer Maternal Uncle    • Breast cancer Family    • Ovarian cancer Family    • Substance Abuse Neg Hx    • Mental illness Neg Hx    • Alcohol abuse Neg Hx      Social History     Socioeconomic History   • Marital status: /Civil Union     Spouse name: Not on file   • Number of children: Not on file   • Years of education: Not on file   • Highest education level: Not on file   Occupational History   • Occupation: Retired - Homemaker   Tobacco Use   • Smoking status: Never   • Smokeless tobacco: Never   Vaping Use   • Vaping Use: Never used   Substance and Sexual Activity   • Alcohol use: No   • Drug use: No   • Sexual activity: Yes     Partners: Male   Other Topics Concern   • Not on file   Social History Narrative    Always uses seat belt    Caffeine use - coffee, green tea amount unspecified    Has smoke detectors     Social Determinants of Health     Financial Resource Strain: Not on file   Food Insecurity: Not on file   Transportation Needs: Not on file   Physical Activity: Not on file   Stress: Not on file   Social Connections: Not on file   Intimate Partner Violence: Not on file   Housing Stability: Not on file       Current Outpatient Medications:   •  albuterol (ProAir HFA) 90 mcg/act inhaler, Inhale 2 puffs every 4 (four) hours as needed for wheezing or shortness of breath, Disp: 8.5 g, Rfl: 1  •  Cholecalciferol 50 MCG (2000 UT) CAPS, Take 1 capsule by mouth, Disp: , Rfl:   •  loratadine (CLARITIN) 10 mg tablet, Take 10 mg by mouth daily, Disp: , Rfl:   •  Multiple Vitamins-Minerals (PRESERVISION AREDS 2+MULTI VIT PO), One twice a day., Disp: , Rfl:     Review of Systems   Constitutional: Negative for appetite change, chills, fatigue, fever and unexpected weight change. Respiratory: Negative for cough, shortness of breath and wheezing. Cardiovascular: Negative for chest pain, palpitations and leg swelling. Gastrointestinal: Negative for abdominal pain, blood in stool, constipation, diarrhea, nausea and vomiting. Endocrine: Positive for polydipsia. Negative for cold intolerance and heat intolerance. Genitourinary: Negative for difficulty urinating, dysuria, flank pain, frequency, hematuria, pelvic pain and urgency. Musculoskeletal: Negative for arthralgias, back pain, joint swelling, myalgias and neck pain. Neurological: Negative for dizziness, syncope, weakness, numbness and headaches. Hematological: Negative for adenopathy. Psychiatric/Behavioral: Negative for dysphoric mood and sleep disturbance. The patient is not nervous/anxious. Objective:    Vitals:    09/12/23 1554 09/12/23 1626   BP: 140/90 132/82   BP Location:  Left arm   Patient Position:  Sitting   Cuff Size:  Standard   Pulse: 78    Resp: 16    Temp: 97.6 °F (36.4 °C)    Weight: 76.6 kg (168 lb 12.8 oz)    Height: 5' 6" (1.676 m)      BP Readings from Last 3 Encounters:   09/12/23 132/82   06/12/23 130/80   06/20/22 138/82        Physical Exam  Constitutional:       General: She is not in acute distress. Appearance: She is well-developed. Neck:      Thyroid: No thyromegaly. Cardiovascular:      Rate and Rhythm: Normal rate and regular rhythm. Heart sounds: Normal heart sounds. No murmur heard. Pulmonary:      Effort: Pulmonary effort is normal. No respiratory distress. Breath sounds: No wheezing, rhonchi or rales. Abdominal:      General: There is no distension. Palpations: Abdomen is soft. There is no mass. Tenderness: There is no abdominal tenderness. Musculoskeletal:      Cervical back: Neck supple. Right lower leg: No edema. Left lower leg: No edema. Lymphadenopathy:      Cervical: No cervical adenopathy. Neurological:      Mental Status: She is alert and oriented to person, place, and time. Psychiatric:         Mood and Affect: Mood normal.         Behavior: Behavior normal.         Thought Content: Thought content normal.          BMI Counseling: Body mass index is 27.25 kg/m². The BMI is above normal. Nutrition recommendations include 3-5 servings of fruits/vegetables daily and consuming healthier snacks. Exercise recommendations include exercising 3-5 times per week.

## 2023-09-12 NOTE — PROGRESS NOTES
ADULT ANNUAL PHYSICAL  [id number] [facility] PRACTICE    NAME: Elizabeth Osorio  AGE: 74 y.o. SEX: female  : 1955     DATE: 2023     Assessment and Plan:     Annual physical exam  - screening labs, mammogram and DEXA scan ordered and we will call with results once available  - Mammo screening bilateral w 3d & cad; Future  - CBC and differential; Future  - Lipid Panel with Direct LDL reflex; Future  - Comprehensive metabolic panel; Future  - TSH, 3rd generation with Free T4 reflex; Future  - UA (URINE) with reflex to Scope  - Vitamin D 25 hydroxy; Future      Immunizations and preventive care screenings were discussed with patient today. Appropriate education was printed on patient's after visit summary. Counseling:  Alcohol/drug use: discussed moderation in alcohol intake, the recommendations for healthy alcohol use, and avoidance of illicit drug use. Dental Health: discussed importance of regular tooth brushing, flossing, and dental visits. Injury prevention: discussed safety/seat belts, safety helmets, smoke detectors, carbon dioxide detectors, and smoking near bedding or upholstery. · Exercise: the importance of regular exercise/physical activity was discussed. Recommend exercise 3-5 times per week for at least 30 minutes. Return in about 6 months (around 3/12/2024) for Recheck. Chief Complaint:     Chief Complaint   Patient presents with   • Establish Care     Returning pt   • Polydipsia   • Excessive Sweating     During the day mostly and sometimes at night       History of Present Illness:     Adult Annual Physical   Patient here for a comprehensive physical exam as a returning patient. The patient reports polydipsia. Diet and Physical Activity  · Diet/Nutrition: well balanced diet. · Exercise: walking.       Depression Screening  PHQ-2/9 Depression Screening    Little interest or pleasure in doing things: 0 - not at all  Feeling down, depressed, or hopeless: 0 - not at all  PHQ-2 Score: 0  PHQ-2 Interpretation: Negative depression screen       General Health  · Sleep: sleeps well. · Hearing: no issues. · Vision: most recent eye exam >1 year ago. · Dental: regular dental visits. /GYN Health  · Patient is: postmenopausal     Review of Systems:     Review of Systems   Constitutional: Negative for appetite change, chills, fatigue, fever and unexpected weight change. HENT: Negative for congestion, ear pain, rhinorrhea, sore throat and trouble swallowing. Eyes: Negative for pain, discharge, redness, itching and visual disturbance. Respiratory: Negative for cough, shortness of breath and wheezing. Cardiovascular: Negative for chest pain, palpitations and leg swelling. Gastrointestinal: Negative for abdominal pain, blood in stool, constipation, diarrhea, nausea and vomiting. Endocrine: Positive for polydipsia. Negative for cold intolerance, heat intolerance and polyuria. Genitourinary: Negative for dysuria, frequency, hematuria, pelvic pain, urgency, vaginal bleeding and vaginal discharge. Musculoskeletal: Negative for arthralgias, back pain, gait problem, joint swelling and myalgias. Skin: Negative for rash. Neurological: Negative for dizziness, syncope, weakness, numbness and headaches. Hematological: Negative for adenopathy. Psychiatric/Behavioral: Negative for dysphoric mood and sleep disturbance. The patient is not nervous/anxious.        Past Medical History:     Past Medical History:   Diagnosis Date   • Brachial plexus injury 1982    with permanent nerve damage    • Endometrial hyperplasia, unspecified     resolved 11/26/2014   • Plantar fasciitis    • Tachycardia     intermittent had full cardiac workup-benign   • Uterine cyst 2014    removed      Past Surgical History:     Past Surgical History:   Procedure Laterality Date   • COLONOSCOPY  07/15/2015    normal   • CYSTOSCOPY 12/15/2014    last assesed 12/15/2014, managed by Janelle Smallwood      Social History:     Social History     Socioeconomic History   • Marital status: /Civil Union     Spouse name: None   • Number of children: None   • Years of education: None   • Highest education level: None   Occupational History   • Occupation: Retired - Homemaker   Tobacco Use   • Smoking status: Never   • Smokeless tobacco: Never   Vaping Use   • Vaping Use: Never used   Substance and Sexual Activity   • Alcohol use: No   • Drug use: No   • Sexual activity: Yes     Partners: Male   Other Topics Concern   • None   Social History Narrative    Always uses seat belt    Caffeine use - coffee, green tea amount unspecified    Has smoke detectors     Social Determinants of Health     Financial Resource Strain: Not on file   Food Insecurity: Not on file   Transportation Needs: Not on file   Physical Activity: Not on file   Stress: Not on file   Social Connections: Not on file   Intimate Partner Violence: Not on file   Housing Stability: Not on file      Family History:     Family History   Problem Relation Age of Onset   • Colon cancer Mother    • No Known Problems Father    • Hyperlipidemia Brother    • Colon cancer Maternal Uncle    • Breast cancer Family    • Ovarian cancer Family    • Substance Abuse Neg Hx    • Mental illness Neg Hx    • Alcohol abuse Neg Hx       Current Medications:     Current Outpatient Medications   Medication Sig Dispense Refill   • albuterol (ProAir HFA) 90 mcg/act inhaler Inhale 2 puffs every 4 (four) hours as needed for wheezing or shortness of breath 8.5 g 1   • Cholecalciferol 50 MCG (2000 UT) CAPS Take 1 capsule by mouth     • loratadine (CLARITIN) 10 mg tablet Take 10 mg by mouth daily     • Multiple Vitamins-Minerals (PRESERVISION AREDS 2+MULTI VIT PO) One twice a day. No current facility-administered medications for this visit. Allergies:      Allergies   Allergen Reactions   • Amoxicillin Rash and Shortness Of Breath   • Ciprofloxacin Myalgia   • Darvon [Propoxyphene] Dizziness      Physical Exam:     /82 (BP Location: Left arm, Patient Position: Sitting, Cuff Size: Standard)   Pulse 78   Temp 97.6 °F (36.4 °C)   Resp 16   Ht 5' 6" (1.676 m)   Wt 76.6 kg (168 lb 12.8 oz)   LMP  (LMP Unknown)   BMI 27.25 kg/m²     Physical Exam  Constitutional:       General: She is not in acute distress. Appearance: She is well-developed. HENT:      Head: Normocephalic and atraumatic. Right Ear: Tympanic membrane, ear canal and external ear normal.      Left Ear: Tympanic membrane, ear canal and external ear normal.      Mouth/Throat:      Mouth: Mucous membranes are moist.      Pharynx: Oropharynx is clear. Eyes:      General: No scleral icterus. Extraocular Movements: Extraocular movements intact. Conjunctiva/sclera: Conjunctivae normal.      Pupils: Pupils are equal, round, and reactive to light. Neck:      Thyroid: No thyromegaly. Vascular: No JVD. Cardiovascular:      Rate and Rhythm: Normal rate and regular rhythm. Heart sounds: Normal heart sounds. No murmur heard. Pulmonary:      Effort: Pulmonary effort is normal. No respiratory distress. Breath sounds: Normal breath sounds. No wheezing, rhonchi or rales. Abdominal:      General: There is no distension. Palpations: Abdomen is soft. There is no mass. Tenderness: There is no abdominal tenderness. Musculoskeletal:      Cervical back: Neck supple. Right lower leg: No edema. Left lower leg: No edema. Lymphadenopathy:      Cervical: No cervical adenopathy. Skin:     Findings: No rash. Neurological:      General: No focal deficit present. Mental Status: She is alert and oriented to person, place, and time. Cranial Nerves: No cranial nerve deficit. Psychiatric:         Mood and Affect: Mood normal.         Behavior: Behavior normal.         Thought Content:  Thought content normal.         Judgment: Judgment normal.        MD Nasir Perez

## 2023-09-18 ENCOUNTER — APPOINTMENT (OUTPATIENT)
Dept: LAB | Facility: CLINIC | Age: 68
End: 2023-09-18
Payer: COMMERCIAL

## 2023-09-18 DIAGNOSIS — Z13.0 SCREENING FOR BLOOD DISEASE: ICD-10-CM

## 2023-09-18 DIAGNOSIS — E55.9 VITAMIN D DEFICIENCY: ICD-10-CM

## 2023-09-18 DIAGNOSIS — R63.1 POLYDIPSIA: ICD-10-CM

## 2023-09-18 DIAGNOSIS — Z13.29 SCREENING FOR THYROID DISORDER: ICD-10-CM

## 2023-09-18 DIAGNOSIS — Z13.228 SCREENING FOR METABOLIC DISORDER: ICD-10-CM

## 2023-09-18 DIAGNOSIS — Z13.220 SCREENING FOR LIPID DISORDERS: ICD-10-CM

## 2023-09-18 DIAGNOSIS — Z13.1 SCREENING FOR DIABETES MELLITUS: ICD-10-CM

## 2023-09-18 LAB
25(OH)D3 SERPL-MCNC: 40.4 NG/ML (ref 30–100)
ALBUMIN SERPL BCP-MCNC: 4.3 G/DL (ref 3.5–5)
ALP SERPL-CCNC: 73 U/L (ref 34–104)
ALT SERPL W P-5'-P-CCNC: 16 U/L (ref 7–52)
ANION GAP SERPL CALCULATED.3IONS-SCNC: 7 MMOL/L
AST SERPL W P-5'-P-CCNC: 15 U/L (ref 13–39)
BASOPHILS # BLD AUTO: 0.04 THOUSANDS/ÂΜL (ref 0–0.1)
BASOPHILS NFR BLD AUTO: 1 % (ref 0–1)
BILIRUB SERPL-MCNC: 0.65 MG/DL (ref 0.2–1)
BILIRUB UR QL STRIP: NEGATIVE
BUN SERPL-MCNC: 12 MG/DL (ref 5–25)
CALCIUM SERPL-MCNC: 9.6 MG/DL (ref 8.4–10.2)
CHLORIDE SERPL-SCNC: 104 MMOL/L (ref 96–108)
CHOLEST SERPL-MCNC: 237 MG/DL
CLARITY UR: CLEAR
CO2 SERPL-SCNC: 31 MMOL/L (ref 21–32)
COLOR UR: NORMAL
CREAT SERPL-MCNC: 0.77 MG/DL (ref 0.6–1.3)
EOSINOPHIL # BLD AUTO: 0.2 THOUSAND/ÂΜL (ref 0–0.61)
EOSINOPHIL NFR BLD AUTO: 4 % (ref 0–6)
ERYTHROCYTE [DISTWIDTH] IN BLOOD BY AUTOMATED COUNT: 12.7 % (ref 11.6–15.1)
GFR SERPL CREATININE-BSD FRML MDRD: 81 ML/MIN/1.73SQ M
GLUCOSE P FAST SERPL-MCNC: 80 MG/DL (ref 65–99)
GLUCOSE UR STRIP-MCNC: NEGATIVE MG/DL
HCT VFR BLD AUTO: 44.1 % (ref 34.8–46.1)
HDLC SERPL-MCNC: 52 MG/DL
HGB BLD-MCNC: 14.4 G/DL (ref 11.5–15.4)
HGB UR QL STRIP.AUTO: NEGATIVE
IMM GRANULOCYTES # BLD AUTO: 0.02 THOUSAND/UL (ref 0–0.2)
IMM GRANULOCYTES NFR BLD AUTO: 0 % (ref 0–2)
KETONES UR STRIP-MCNC: NEGATIVE MG/DL
LDLC SERPL CALC-MCNC: 150 MG/DL (ref 0–100)
LEUKOCYTE ESTERASE UR QL STRIP: NEGATIVE
LYMPHOCYTES # BLD AUTO: 1.43 THOUSANDS/ÂΜL (ref 0.6–4.47)
LYMPHOCYTES NFR BLD AUTO: 28 % (ref 14–44)
MCH RBC QN AUTO: 29.8 PG (ref 26.8–34.3)
MCHC RBC AUTO-ENTMCNC: 32.7 G/DL (ref 31.4–37.4)
MCV RBC AUTO: 91 FL (ref 82–98)
MONOCYTES # BLD AUTO: 0.39 THOUSAND/ÂΜL (ref 0.17–1.22)
MONOCYTES NFR BLD AUTO: 8 % (ref 4–12)
NEUTROPHILS # BLD AUTO: 2.95 THOUSANDS/ÂΜL (ref 1.85–7.62)
NEUTS SEG NFR BLD AUTO: 59 % (ref 43–75)
NITRITE UR QL STRIP: NEGATIVE
NRBC BLD AUTO-RTO: 0 /100 WBCS
PH UR STRIP.AUTO: 7 [PH]
PLATELET # BLD AUTO: 255 THOUSANDS/UL (ref 149–390)
PMV BLD AUTO: 11.8 FL (ref 8.9–12.7)
POTASSIUM SERPL-SCNC: 3.8 MMOL/L (ref 3.5–5.3)
PROT SERPL-MCNC: 6.6 G/DL (ref 6.4–8.4)
PROT UR STRIP-MCNC: NEGATIVE MG/DL
RBC # BLD AUTO: 4.83 MILLION/UL (ref 3.81–5.12)
SODIUM SERPL-SCNC: 142 MMOL/L (ref 135–147)
SP GR UR STRIP.AUTO: 1.01 (ref 1–1.03)
TRIGL SERPL-MCNC: 176 MG/DL
TSH SERPL DL<=0.05 MIU/L-ACNC: 1.32 UIU/ML (ref 0.45–4.5)
UROBILINOGEN UR STRIP-ACNC: <2 MG/DL
WBC # BLD AUTO: 5.03 THOUSAND/UL (ref 4.31–10.16)

## 2023-09-18 PROCEDURE — 82306 VITAMIN D 25 HYDROXY: CPT

## 2023-09-18 PROCEDURE — 80053 COMPREHEN METABOLIC PANEL: CPT

## 2023-09-18 PROCEDURE — 85025 COMPLETE CBC W/AUTO DIFF WBC: CPT

## 2023-09-18 PROCEDURE — 83036 HEMOGLOBIN GLYCOSYLATED A1C: CPT

## 2023-09-18 PROCEDURE — 81003 URINALYSIS AUTO W/O SCOPE: CPT | Performed by: FAMILY MEDICINE

## 2023-09-18 PROCEDURE — 36415 COLL VENOUS BLD VENIPUNCTURE: CPT

## 2023-09-18 PROCEDURE — 84443 ASSAY THYROID STIM HORMONE: CPT

## 2023-09-18 PROCEDURE — 80061 LIPID PANEL: CPT

## 2023-09-19 LAB
EST. AVERAGE GLUCOSE BLD GHB EST-MCNC: 117 MG/DL
HBA1C MFR BLD: 5.7 %

## 2023-09-26 ENCOUNTER — HOSPITAL ENCOUNTER (OUTPATIENT)
Facility: MEDICAL CENTER | Age: 68
Discharge: HOME | End: 2023-09-26
Payer: COMMERCIAL

## 2023-09-26 DIAGNOSIS — G95.0 SYRINGOMYELIA (HCC): ICD-10-CM

## 2023-09-26 PROCEDURE — 72146 MRI CHEST SPINE W/O DYE: CPT

## 2023-09-26 PROCEDURE — G1004 CDSM NDSC: HCPCS

## 2023-10-19 ENCOUNTER — HOSPITAL ENCOUNTER (OUTPATIENT)
Dept: MAMMOGRAPHY | Facility: CLINIC | Age: 68
Discharge: HOME | End: 2023-10-19
Payer: COMMERCIAL

## 2023-10-19 ENCOUNTER — HOSPITAL ENCOUNTER (OUTPATIENT)
Dept: BONE DENSITY | Facility: CLINIC | Age: 68
Discharge: HOME | End: 2023-10-19
Payer: COMMERCIAL

## 2023-10-19 VITALS — BODY MASS INDEX: 27.32 KG/M2 | WEIGHT: 170 LBS | HEIGHT: 66 IN

## 2023-10-19 VITALS — BODY MASS INDEX: 27 KG/M2 | HEIGHT: 66 IN | WEIGHT: 168 LBS

## 2023-10-19 DIAGNOSIS — Z13.820 SCREENING FOR OSTEOPOROSIS: ICD-10-CM

## 2023-10-19 DIAGNOSIS — Z12.31 ENCOUNTER FOR SCREENING MAMMOGRAM FOR MALIGNANT NEOPLASM OF BREAST: ICD-10-CM

## 2023-10-19 DIAGNOSIS — Z78.0 POSTMENOPAUSAL: ICD-10-CM

## 2023-10-19 PROCEDURE — 77067 SCR MAMMO BI INCL CAD: CPT

## 2023-10-19 PROCEDURE — 77063 BREAST TOMOSYNTHESIS BI: CPT

## 2023-10-19 PROCEDURE — 77080 DXA BONE DENSITY AXIAL: CPT

## 2023-12-20 ENCOUNTER — HOSPITAL ENCOUNTER (OUTPATIENT)
Dept: MRI IMAGING | Facility: HOSPITAL | Age: 68
Discharge: HOME | End: 2023-12-20
Payer: COMMERCIAL

## 2023-12-20 DIAGNOSIS — M54.16 RADICULOPATHY, LUMBAR REGION: ICD-10-CM

## 2023-12-20 DIAGNOSIS — M48.062 SPINAL STENOSIS, LUMBAR REGION WITH NEUROGENIC CLAUDICATION: ICD-10-CM

## 2023-12-20 PROCEDURE — G1004 CDSM NDSC: HCPCS

## 2023-12-20 PROCEDURE — 72148 MRI LUMBAR SPINE W/O DYE: CPT

## 2024-01-23 ENCOUNTER — TELEPHONE (OUTPATIENT)
Dept: FAMILY MEDICINE CLINIC | Facility: CLINIC | Age: 69
End: 2024-01-23

## 2024-01-23 NOTE — TELEPHONE ENCOUNTER
Pt was told by Ortho that she needs to see a neurosurgeon - she is looking for a dr to dr referral and also a specific dr recommendation please.

## 2024-01-24 DIAGNOSIS — G95.0 SYRINGOMYELIA (HCC): ICD-10-CM

## 2024-01-24 DIAGNOSIS — M48.062 SPINAL STENOSIS, LUMBAR REGION WITH NEUROGENIC CLAUDICATION: ICD-10-CM

## 2024-01-24 DIAGNOSIS — M54.16 RADICULOPATHY, LUMBAR REGION: Primary | ICD-10-CM

## 2024-01-24 NOTE — TELEPHONE ENCOUNTER
Patient was seen by [facility] Neurosurgery Dr. Judie Pappas in the past. Dose she want to follow up with her or see Neurosurgery at [facility]?

## 2024-02-02 ENCOUNTER — CONSULT (OUTPATIENT)
Dept: NEUROSURGERY | Facility: CLINIC | Age: 69
End: 2024-02-02
Payer: COMMERCIAL

## 2024-02-02 VITALS
BODY MASS INDEX: 27.32 KG/M2 | TEMPERATURE: 98.3 F | RESPIRATION RATE: 16 BRPM | DIASTOLIC BLOOD PRESSURE: 86 MMHG | HEIGHT: 66 IN | SYSTOLIC BLOOD PRESSURE: 118 MMHG | OXYGEN SATURATION: 98 % | HEART RATE: 66 BPM | WEIGHT: 170 LBS

## 2024-02-02 DIAGNOSIS — M54.16 RADICULOPATHY, LUMBAR REGION: ICD-10-CM

## 2024-02-02 DIAGNOSIS — M48.062 SPINAL STENOSIS, LUMBAR REGION WITH NEUROGENIC CLAUDICATION: ICD-10-CM

## 2024-02-02 DIAGNOSIS — G95.0 SYRINGOMYELIA (HCC): ICD-10-CM

## 2024-02-02 PROCEDURE — 99204 OFFICE O/P NEW MOD 45 MIN: CPT | Performed by: NEUROLOGICAL SURGERY

## 2024-02-02 RX ORDER — METHOCARBAMOL 750 MG/1
750 TABLET, FILM COATED ORAL EVERY 6 HOURS PRN
Qty: 90 TABLET | Refills: 3 | Status: SHIPPED | OUTPATIENT
Start: 2024-02-02

## 2024-02-02 RX ORDER — GABAPENTIN 300 MG/1
300 CAPSULE ORAL
Qty: 60 CAPSULE | Refills: 3 | Status: SHIPPED | OUTPATIENT
Start: 2024-02-02

## 2024-02-02 NOTE — PROGRESS NOTES
Neurosurgery Office Note  Corinne J Snyder 68 y.o. female MRN: [id number]      Assessment/Plan        Problem List Items Addressed This Visit       Visit Diagnoses       Radiculopathy, lumbar region        Relevant Medications    gabapentin (Neurontin) 300 mg capsule    methocarbamol (Robaxin-750) 750 mg tablet    Other Relevant Orders    BUN    Creatinine, serum    MRI thoracic spine w wo contrast    Ambulatory Referral to Physical Therapy    Ambulatory referral to Spine & Pain Management    Spinal stenosis, lumbar region with neurogenic claudication        Syringomyelia (HCC)        Relevant Orders    BUN    Creatinine, serum    MRI thoracic spine w wo contrast    Ambulatory Referral to Physical Therapy    Ambulatory referral to Spine & Pain Management              Discussion:  Patient is a 68-year-old female with h/o remote brachial plexus injury with permanent nerve damage 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 No radicular pain or numbness attributable to the spine, objective weakness, gait imbalance, saddle anesthesia, bowel/bladder incontinence, recent trauma, prior spinal surgery.    No recent evaluation or therapies per PT or Pain Medicine at [facility]. Underwent MATTHEW at OSH yesterday 2/1/24, reportedly right L5 level.    MRI thoracic spine without contrast on 9/26/23 showed syringomyelia of thoracic cord centered at T6 level (maximal ~3 mm), slightly increased compared to prior MRI in 2013, without abnormal cord edema/expansion. Minimal disc herniations without significant central stenosis or cord compression.    MRI lumbar spine on 12/20/23 showed mild chronic degenerative changes most prominent at L4/5.    She is neurologically intact on exam without long tract signs.    I explained that the mild interval increase in  thoracic syringomyelia over 10 years is likely a non-specific, benign process, especially without aggressive radiographic features involving the spinal cord and without clinical evidence of myelopathy. We will monitor with repeat thoracic spine with and without contrast in 1 year for interval comparison with SNPX follow-up.    No acute neurosurgical intervention indicated for chronic findings on lumbar spine MRI, which also do not correlate with her symptoms involving medial inner thighs/calves. I will order trial of Neurontin 300 mg QHS and Robaxin PRN as well as place referrals to PT and Pain Medicine for conservative management.    All questions and concerns were addressed during this visit.          CHIEF COMPLAINT    Chief Complaint   Patient presents with    Consult       HISTORY    History of Present Illness     68 y.o. year old female     HPI    See Discussion    REVIEW OF SYSTEMS    Review of Systems   Genitourinary:         Prolapsed   Musculoskeletal:  Positive for back pain, gait problem and myalgias.        Pain that starts in groin and does down the inside of bi lateral legs with shooting pain that travels down. Pain non top of L foot.   Also has LBP that radiates into buttocks  Starting issues in Aug/Sept with steps and distance from pain. Holds hand rail with steps. No falls.     MATTHEW 2/1/24- In [place]. Is unable to rate an improvement yet.   MRI 12/20/23    Has issues opening items with bi lateral hands/strength   Neurological:  Positive for weakness. Negative for numbness.         Meds/Allergies     Current Outpatient Medications   Medication Sig Dispense Refill    Acetaminophen (TYLENOL PO) Take by mouth if needed      albuterol (ProAir HFA) 90 mcg/act inhaler Inhale 2 puffs every 4 (four) hours as needed for wheezing or shortness of breath 8.5 g 1    B Complex Vitamins (VITAMIN-B COMPLEX PO) Take by mouth      Calcium Carb-Cholecalciferol 600-12.5 MG-MCG CAPS Take by mouth      Cholecalciferol 50  MCG (2000 UT) CAPS Take 1 capsule by mouth      Naproxen Sodium (ALEVE PO) Take by mouth as needed      loratadine (CLARITIN) 10 mg tablet Take 10 mg by mouth daily (Patient not taking: Reported on 2/2/2024)      Multiple Vitamins-Minerals (PRESERVISION AREDS 2+MULTI VIT PO) One twice a day. (Patient not taking: Reported on 2/2/2024)       No current facility-administered medications for this visit.       Allergies   Allergen Reactions    Amoxicillin Rash and Shortness Of Breath    Ciprofloxacin Myalgia    Darvon [Propoxyphene] Dizziness       PAST HISTORY    Past Medical History:   Diagnosis Date    Brachial plexus injury 1982    with permanent nerve damage     Endometrial hyperplasia, unspecified     resolved 11/26/2014    Pelvic floor dysfunction     Plantar fasciitis     Tachycardia     intermittent had full cardiac workup-benign    Uterine cyst 2014    removed       Past Surgical History:   Procedure Laterality Date    COLONOSCOPY  07/15/2015    normal    COLONOSCOPY  06/2021    Dr Luna. Hyperplastic polyp removed.    CYSTOSCOPY  12/15/2014    last assesed 12/15/2014, managed by Jigar Arenas       Social History     Tobacco Use    Smoking status: Never    Smokeless tobacco: Never   Vaping Use    Vaping status: Never Used   Substance Use Topics    Alcohol use: No    Drug use: No       Family History   Problem Relation Age of Onset    Colon cancer Mother 82        Metastatic    No Known Problems Father     Hyperlipidemia Brother     Breast cancer Maternal Aunt     Colon cancer Maternal Uncle     Ovarian cancer Cousin     Cancer Cousin         female c ancer    Breast cancer Family     Ovarian cancer Family     Substance Abuse Neg Hx     Mental illness Neg Hx     Alcohol abuse Neg Hx          The following portions of the patient's history were reviewed in this encounter and updated as appropriate: Past medical, surgical, family, and social history, as well as medications, allergies, and review of  "systems.      EXAM    Vitals:Blood pressure 118/86, pulse 66, temperature 98.3 °F (36.8 °C), temperature source Temporal, resp. rate 16, height 5' 6\" (1.676 m), weight 77.1 kg (170 lb), SpO2 98%, not currently breastfeeding.,Body mass index is 27.44 kg/m².     Physical Exam  Constitutional:       Appearance: Normal appearance.   HENT:      Head: Normocephalic and atraumatic.   Eyes:      Extraocular Movements: Extraocular movements intact and EOM normal.      Pupils: Pupils are equal, round, and reactive to light.   Cardiovascular:      Rate and Rhythm: Normal rate and regular rhythm.      Pulses: Normal pulses.      Heart sounds: Normal heart sounds.   Pulmonary:      Effort: Pulmonary effort is normal.      Breath sounds: Normal breath sounds.   Abdominal:      General: Abdomen is flat.      Palpations: Abdomen is soft.   Musculoskeletal:         General: Normal range of motion.      Cervical back: Normal range of motion.   Skin:     General: Skin is warm.   Neurological:      General: No focal deficit present.      Mental Status: She is alert and oriented to person, place, and time. Mental status is at baseline.      Motor: Motor strength is normal.     Gait: Gait is intact.   Psychiatric:         Mood and Affect: Mood normal.         Speech: Speech normal.         Behavior: Behavior normal.       Neurologic Exam     Mental Status   Oriented to person, place, and time.   Attention: normal.   Speech: speech is normal   Level of consciousness: alert    Cranial Nerves     CN II   Visual fields full to confrontation.   Visual acuity: normal  Right visual field deficit: none  Left visual field deficit: none     CN III, IV, VI   Pupils are equal, round, and reactive to light.  Extraocular motions are normal.   CN III: no CN III palsy  CN VI: no CN VI palsy  Nystagmus: none   Diplopia: none  Ophthalmoparesis: none  Upgaze: normal  Downgaze: normal  Conjugate gaze: present    CN V   Facial sensation intact.   Right " facial sensation deficit: none  Left facial sensation deficit: none    CN VII   Facial expression full, symmetric.   Right facial weakness: none  Left facial weakness: none    CN VIII   CN VIII normal.     CN IX, X   CN IX normal.   CN X normal.   Palate: symmetric    CN XI   CN XI normal.   Right sternocleidomastoid strength: normal  Left sternocleidomastoid strength: normal  Right trapezius strength: normal  Left trapezius strength: normal    CN XII   CN XII normal.   Tongue deviation: none    Motor Exam   Muscle bulk: normal  Overall muscle tone: normal  Right arm pronator drift: absent  Left arm pronator drift: absent    Strength   Strength 5/5 throughout.     Sensory Exam   Light touch normal.     Gait, Coordination, and Reflexes     Gait  Gait: normal    Reflexes   Reflexes 2+ except as noted.   Negative Oliveira, Babinski, clonus       MEDICAL DECISION MAKING    Imaging Studies:     No results found.    I have personally reviewed pertinent reports.   and I have personally reviewed pertinent films in PACS      Kulwant Spaulding M.D.  Neurosurgeon

## 2024-02-07 ENCOUNTER — CONSULT (OUTPATIENT)
Dept: PAIN MEDICINE | Facility: CLINIC | Age: 69
End: 2024-02-07
Payer: COMMERCIAL

## 2024-02-07 VITALS
HEART RATE: 79 BPM | TEMPERATURE: 97.8 F | HEIGHT: 66 IN | DIASTOLIC BLOOD PRESSURE: 78 MMHG | WEIGHT: 170 LBS | SYSTOLIC BLOOD PRESSURE: 138 MMHG | BODY MASS INDEX: 27.32 KG/M2

## 2024-02-07 DIAGNOSIS — M54.16 RADICULOPATHY, LUMBAR REGION: ICD-10-CM

## 2024-02-07 DIAGNOSIS — G95.0 SYRINGOMYELIA (HCC): ICD-10-CM

## 2024-02-07 DIAGNOSIS — M48.062 SPINAL STENOSIS OF LUMBAR REGION WITH NEUROGENIC CLAUDICATION: Primary | ICD-10-CM

## 2024-02-07 PROCEDURE — 99204 OFFICE O/P NEW MOD 45 MIN: CPT | Performed by: ANESTHESIOLOGY

## 2024-02-07 NOTE — PROGRESS NOTES
Assessment  1. Spinal stenosis of lumbar region with neurogenic claudication    2. Radiculopathy, lumbar region    3. Syringomyelia (HCC)        Plan    Extremely pleasant 68-year-old female with complicated history of brachial plexus injury as well as syringomyelia.    Patient referred from neurosurgery.  Patient has been following with [facility] and had a transforaminal epidural steroid injection 6 days ago.  She was started on gabapentin by neurosurgery with excellent relief.    She will continue to follow with neurosurgery regarding her synringomylia.    Long discussion was held with the patient and her  who was in attendance.  Patient has signs and symptoms of neurogenic claudication secondary to stenosis at the L4-5 level.  She does feel the gabapentin is providing some relief.    She was also prescribed physical therapy.  I will have her see physical therapy for evaluation and consideration of treatment.    I also recommended water therapy.    At this point in time patient will follow-up on an as-needed basis.    My impressions and treatment recommendations were discussed in detail with the patient who verbalized understanding and had no further questions.  Discharge instructions were provided. I personally saw and examined the patient and I agree with the above discussed plan of care.  This note is created using dictation transcription.  It may contain typographical errors, grammatical errors, improperly dictated words, background noise and other errors.    Referred By: Kulwant Spaulding MD  History of Present Illness    Corinne J Snyder is a 68 y.o. female with 6-month history of worsening low back and lower extremity pain.  She was being evaluated at [facility] and underwent a lumbar transforaminal epidural steroid injection 6 days ago.  She followed with neurosurgery who referred her to our office.  She was given a prescription for gabapentin as well as physical therapy.  The gabapentin is  providing some relief.  Her pain is moderate to severe rates as 5 out of 10 the visual analog scale is nearly constant.  Describes as cramping shooting with a burning sensation.  Standing bending sitting and walking all aggravate her symptoms.  Therapy provides moderate relief as does heat and ice.  Denies any loss of bowel or bladder function.    I have personally reviewed and/or updated the patient's past medical history, past surgical history, family history, social history, current medications, allergies, and vital signs today.     Review of Systems   Constitutional:  Negative for fever and unexpected weight change.   HENT:  Negative for trouble swallowing.    Eyes:  Negative for visual disturbance.   Respiratory:  Negative for shortness of breath and wheezing.    Cardiovascular:  Negative for chest pain and palpitations.   Gastrointestinal:  Negative for constipation, diarrhea, nausea and vomiting.   Endocrine: Positive for polydipsia and polyuria. Negative for cold intolerance and heat intolerance.   Genitourinary:  Negative for difficulty urinating and frequency.   Musculoskeletal:  Positive for arthralgias and myalgias. Negative for gait problem and joint swelling.   Skin:  Negative for rash.   Neurological:  Negative for dizziness, seizures, syncope, weakness and headaches.   Hematological:  Does not bruise/bleed easily.   Psychiatric/Behavioral:  Negative for dysphoric mood.    All other systems reviewed and are negative.      Patient Active Problem List   Diagnosis    Abnormal weight gain    Backache    Dyspareunia    Head injury    Macular degeneration    Other muscle spasm    Pain in female genitalia on intercourse    Family history of colon cancer       Past Medical History:   Diagnosis Date    Arthritis     Brachial plexus injury 1982    with permanent nerve damage     Endometrial hyperplasia, unspecified     resolved 11/26/2014    Pelvic floor dysfunction     Plantar fasciitis     Tachycardia      intermittent had full cardiac workup-benign    Uterine cyst 2014    removed       Past Surgical History:   Procedure Laterality Date    COLONOSCOPY  07/15/2015    normal    COLONOSCOPY  06/2021    Dr Luna. Hyperplastic polyp removed.    CYSTOSCOPY  12/15/2014    last assesed 12/15/2014, managed by Jigar Arenas       Family History   Problem Relation Age of Onset    Colon cancer Mother 82        Metastatic    No Known Problems Father     Hyperlipidemia Brother     Breast cancer Maternal Aunt     Colon cancer Maternal Uncle     Ovarian cancer Cousin     Cancer Cousin         female c ancer    Breast cancer Family     Ovarian cancer Family     Substance Abuse Neg Hx     Mental illness Neg Hx     Alcohol abuse Neg Hx        Social History     Occupational History    Occupation: Retired - Homemaker   Tobacco Use    Smoking status: Never    Smokeless tobacco: Never   Vaping Use    Vaping status: Never Used   Substance and Sexual Activity    Alcohol use: No    Drug use: No    Sexual activity: Yes     Partners: Male       Current Outpatient Medications on File Prior to Visit   Medication Sig    Acetaminophen (TYLENOL PO) Take by mouth if needed    albuterol (ProAir HFA) 90 mcg/act inhaler Inhale 2 puffs every 4 (four) hours as needed for wheezing or shortness of breath    B Complex Vitamins (VITAMIN-B COMPLEX PO) Take by mouth    Calcium Carb-Cholecalciferol 600-12.5 MG-MCG CAPS Take by mouth    Cholecalciferol 50 MCG (2000 UT) CAPS Take 1 capsule by mouth    gabapentin (Neurontin) 300 mg capsule Take 1 capsule (300 mg total) by mouth daily at bedtime    methocarbamol (Robaxin-750) 750 mg tablet Take 1 tablet (750 mg total) by mouth every 6 (six) hours as needed for muscle spasms (back pain)    loratadine (CLARITIN) 10 mg tablet Take 10 mg by mouth daily (Patient not taking: Reported on 2/2/2024)    Multiple Vitamins-Minerals (PRESERVISION AREDS 2+MULTI VIT PO) One twice a day. (Patient not taking: Reported on  "2/2/2024)    Naproxen Sodium (ALEVE PO) Take by mouth as needed (Patient not taking: Reported on 2/7/2024)     No current facility-administered medications on file prior to visit.       Allergies   Allergen Reactions    Amoxicillin Rash and Shortness Of Breath    Ciprofloxacin Myalgia    Darvon [Propoxyphene] Dizziness       Physical Exam    /78 (BP Location: Left arm, Patient Position: Sitting, Cuff Size: Standard)   Pulse 79   Temp 97.8 °F (36.6 °C)   Ht 5' 6\" (1.676 m)   Wt 77.1 kg (170 lb)   LMP  (LMP Unknown)   BMI 27.44 kg/m²     Constitutional: normal, well developed, well nourished, alert, in no distress and non-toxic and no overt pain behavior. and overweight  Eyes: anicteric  HEENT: grossly intact  Neck: supple, symmetric, trachea midline and no masses   Pulmonary:even and unlabored  Cardiovascular:No edema or pitting edema present  Skin:Normal without rashes or lesions and well hydrated  Psychiatric:Mood and affect appropriate  Neurologic:Cranial Nerves II-XII grossly intact  Musculoskeletal:normal, difficulty going from sitting to standing sitting position; no obvious skin lesions or erythema lumbar sacral spine; mild tenderness in lumbar paravertebrals, no sacroiliac or greater trochanteric tenderness bilateral; deep tendon reflexes are diminished but symmetrical bilateral patellar and achilles; no focal motor deficit appreciated lower limbs; negative bilateral straight leg raising.    Imaging  MRI LUMBAR SPINE WITHOUT CONTRAST @  12-20-23     INDICATION: M54.16: Radiculopathy, lumbar region  M48.062: Spinal stenosis, lumbar region with neurogenic claudication.     COMPARISON: X-rays dated 7/11/2013     TECHNIQUE:  Multiplanar, multisequence imaging of the lumbar spine was performed. .        IMAGE QUALITY:  Diagnostic     FINDINGS:     VERTEBRAL BODIES:  There are 5 lumbar type vertebral bodies. Minimal dextroscoliosis of the lumbar spine. Straightening of the normal lumbar lordosis. " Slight anterior spondylolisthesis of L4 in relation to L3. No compression fracture. Type II fatty   endplate marrow degenerative changes at the L2-3, L3-4 and L4-5 levels. At T11 there is a chronic focal compression deformity of the central aspect of the superior endplate consistent with chronic Schmorl's node. No adjacent edema.     SACRUM:  Normal signal within the sacrum. No evidence of insufficiency or stress fracture.     DISTAL CORD AND CONUS:  Normal size and signal within the distal cord and conus.     PARASPINAL SOFT TISSUES:  Paraspinal soft tissues are unremarkable.     LOWER THORACIC DISC SPACES: At T11-12 there is minor degenerative disc disease with a tiny central disc herniation and mild facet arthropathy. No canal stenosis. Mild right foraminal narrowing.     LUMBAR DISC SPACES:     L1-L2: Slight loss of disc height and disc desiccation with mild annular bulging. No canal stenosis or foraminal narrowing.     L2-L3: Disc desiccation and loss of disc height. Mild annular bulging with a tiny central disc protrusion and mild anterior osteophyte formation. Minimal canal stenosis without cauda equina impingement. No foraminal narrowing.     L3-L4: Disc desiccation and loss of disc height. Annular bulging asymmetric towards the left with a broad-based left subarticular and foraminal disc protrusion distorting the left anterior lateral aspect of the thecal sac. Correlate for left L4   radiculopathy. Minor foraminal narrowing with no L3 nerve impingement.     L4-L5: Loss of disc height with disc desiccation and moderate diffuse annular bulging. There is a small central and right paramedian disc herniation. There is a focal right foraminal disc extrusion extending superiorly into the neural foramen. Mild   ligamentum flavum thickening. Moderate tricompartmental canal stenosis with distortion of the right anterior lateral aspect of the thecal sac in particular. There is also mild left but more pronounced  moderate right foraminal narrowing with mild mass   effect upon the exiting nerve. Correlate for right L4 radiculopathy.     L5-S1: Normal disc height and signal. No disc herniation, canal stenosis or foraminal narrowing. Minor facet arthropathy on the right.     OTHER FINDINGS:  None.     IMPRESSION:     Thoracolumbar degenerative disc disease, most pronounced at L4-5 where there are disc herniations distorting the right anterior lateral aspect of the thecal sac and displacing the L4 nerve within the neural foramen. Correlate for right L4 and L5   radiculopathy.     Degenerative disc disease at the L3-4 level asymmetric towards the left with narrowing of the left anterior lateral recess. Correlate for left L4 radiculopathy.      MRI THORACIC SPINE WITHOUT CONTRAST @  9-26-23     INDICATION: G95.0: Syringomyelia and syringobulbia.     COMPARISON: 3/29/2013, outside MRI.     TECHNIQUE:  Multiplanar, multisequence imaging of the thoracic spine was performed. .     IMAGE QUALITY: Diagnostic.     FINDINGS:     ALIGNMENT: Mild levoscoliosis of the upper thoracic spine. No thoracic subluxation. There is a large chronic appearing Schmorl's node within the superior endplate of T11 with no adjacent marrow edema.     MARROW SIGNAL:  Normal marrow signal is identified within the visualized bony structures.  No discrete marrow lesion.     THORACIC CORD: There is a central cystic cavity identified within the thoracic cord beginning in the upper thoracic spine measuring only 1 to 2 mm. However, the abnormality becomes larger at the T6 level where it measures 3 mm in transverse diameter.   This measures approximately 2 mm at the T8 level and extends     PARAVERTEBRAL SOFT TISSUES:  Normal.     THORACIC DEGENERATIVE CHANGE: Small central and left paramedian disc herniation at the T8-9 level. Slight increase in size compared to the prior exam.     At the T11-12 level there is a right paramedian disc herniation resulting in mild  canal stenosis without cord compression. This disc herniation has increased in size compared to the prior examination no foraminal nerve impingement.     OTHER FINDINGS: Review of prior MRI of the neck dated 10/1/2011 demonstrates no Chiari malformation.     IMPRESSION:     Central cystic cavity noted within the thoracic cord largest at the T6 level where it measures 3 mm in size. Compared to the remote study from 2013 this has increased in size. This is consistent with syringomyelia/hydromyelia. No cord expansion.     Small disc herniations at T8-9 and T11-12 which have increased in size compared to 2013. However, there is no canal stenosis or foraminal narrowing.    I have personally reviewed pertinent films in PACS.

## 2024-02-23 ENCOUNTER — EVALUATION (OUTPATIENT)
Dept: PHYSICAL THERAPY | Facility: CLINIC | Age: 69
End: 2024-02-23
Payer: COMMERCIAL

## 2024-02-23 DIAGNOSIS — M54.16 RADICULOPATHY, LUMBAR REGION: ICD-10-CM

## 2024-02-23 DIAGNOSIS — G95.0 SYRINGOMYELIA (HCC): ICD-10-CM

## 2024-02-23 PROCEDURE — 97110 THERAPEUTIC EXERCISES: CPT | Performed by: PHYSICAL THERAPIST

## 2024-02-23 PROCEDURE — 97161 PT EVAL LOW COMPLEX 20 MIN: CPT | Performed by: PHYSICAL THERAPIST

## 2024-02-23 NOTE — PROGRESS NOTES
PT Evaluation     Today's date: 2024  Patient name: Corinne J Snyder  : 1955  MRN: [id number]  Referring provider: Kulwant Spaulding MD  Dx:   Encounter Diagnosis     ICD-10-CM    1. Radiculopathy, lumbar region  M54.16 Ambulatory Referral to Physical Therapy      2. Syringomyelia (HCC)  G95.0 Ambulatory Referral to Physical Therapy                     Assessment  Assessment details: Corinne J Snyder is a 68 y.o. female presenting to outpatient physical therapy at [facility] with complaints of LB and B buttock and medial LE pain to her ankles.  She presents with decreased lumbar range of motion, decreased core and L>R LE strength, limited flexibility, poor postural awareness, poor body mechanics, poor B LE balance, decreased tolerance to activity and decreased functional mobility due to Radiculopathy, lumbar region, syringomyelia (HCC).  She feels much better with flexion based vs extension based positions.  She would benefit from skilled PT services in order to address these deficits and reach maximum level of function.  Thank you for the referral!  Impairments: abnormal gait, abnormal or restricted ROM, activity intolerance, impaired balance, impaired physical strength, lacks appropriate home exercise program, pain with function, poor posture  and poor body mechanics  Barriers to therapy: None  Understanding of Dx/Px/POC: excellent   Prognosis: good    Goals  ST.  Independent with HEP in 2 weeks  2.  Increase lumbar AROM to WNL except for ext all motions in 3 weeks   3.  Good body mechanics in 2 weeks    LT.  Achieve FOTO score of 54/100 in 12 weeks   2.  Able to ambulate x 15 min for ADLs and ascend steps normally in 12 weeks  3.  Strength abdominals = 4/5, B LE's = 5/5 all motions in 12 weeks  4.  No glut tightness in 12 weeks  5.  Good B LE balance in 12 weeks    Plan  Patient would benefit from: skilled PT and PT eval  Planned modality interventions: cryotherapy, TENS and thermotherapy:  hydrocollator packs  Planned therapy interventions: abdominal trunk stabilization, ADL retraining, balance/weight bearing training, body mechanics training, flexibility, functional ROM exercises, home exercise program, joint mobilization, manual therapy, neuromuscular re-education, postural training, strengthening, stretching, therapeutic activities and therapeutic exercise  Frequency: 2x week  Duration in weeks: 12  Treatment plan discussed with: patient        Subjective Evaluation    History of Present Illness  Mechanism of injury: Pt reports having brachial plexus injury with permanent nerve damage 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  She c/o LBP and pain/paresthesias in buttocks, medial inner thighs and legs (not in any lumbosacral nerve distribution) since Aug 2023.  No radicular pain or numbness attributable to the spine.     She had R L5 MATTHEW on 2/1/24 with slight change in symptoms.       MRI thoracic spine without contrast on 9/26/23 showed syringomyelia of thoracic cord centered at T6 level (maximal ~3 mm), slightly increased compared to prior MRI in 2013, without abnormal cord edema/expansion. Minimal disc herniations without significant central stenosis or cord compression.     MRI lumbar spine on 12/20/23 showed mild chronic degenerative changes most prominent at L4/5.    Unable to walk > 10 min due to glut pain B with tightness.  Standing washing dishes is very painful in her LB.  Best position is supine with legs elevated.      Has had PT about 6 months ago without much benefit.     Would like to ride a bike, do household chores, climb stairs well - climbing stairs crawling.                     Recurrent probem    Quality of life: fair    Patient Goals  Patient goals for therapy: decreased pain, improved balance, increased motion, increased strength, independence with ADLs/IADLs and return to sport/leisure  activities    Pain  Current pain ratin  At best pain rating: 3  At worst pain ratin  Quality: tight, radiating, dull ache and cramping  Relieving factors: medications  Aggravating factors: stair climbing, walking and lifting  Progression: worsening    Social Support  Steps to enter house: no  Stairs in house: yes   Lives with: spouse    Employment status: not working  Treatments  Previous treatment: physical therapy, medication and injection treatment  Current treatment: medication        Objective     Concurrent Complaints  Negative for disturbed sleep, bladder dysfunction and bowel dysfunction    Static Posture     Lumbar Spine   Flattened.     Postural Observations  Seated posture: fair  Standing posture: good  Correction of posture: has no consistent effect      Palpation   Left   Hypertonic in the erector spinae.     Right   Hypertonic in the erector spinae.     Neurological Testing     Sensation     Lumbar   Left   Paresthesia: light touch    Right   Paresthesia: light touch    Comments   Left light touch: medial thigh and leg  Right light touch: medial thigh and leg    Active Range of Motion     Lumbar   Flexion:  WFL  Extension:  with pain Restriction level: moderate  Left lateral flexion:  WFL  Right lateral flexion:  WFL  Left rotation:  Restriction level: minimal  Right rotation:  Restriction level: minimal    Passive Range of Motion     Additional Passive Range of Motion Details  Min tightness B piriformis.  B H/S = WNL.  Mechanical Assessment    Cervical      Thoracic      Lumbar    Standing extension:   Pain location: no change  Pain intensity: worse    Strength/Myotome Testing     Left Hip   Planes of Motion   Flexion: 4  Extension: 4-  Abduction: 4+  Adduction: 5    Right Hip   Planes of Motion   Flexion: 4+  Extension: 4  Abduction: 5  Adduction: 5    Left Knee   Flexion: 5  Extension: 4+    Right Knee   Flexion: 5  Extension: 4+    Left Ankle/Foot   Dorsiflexion: 5    Right Ankle/Foot  "  Dorsiflexion: 5    Additional Strength Details  Abdominals = 3/5    Tests     Lumbar     Left   Negative passive SLR and slump test.     Right   Negative passive SLR and slump test.     Ambulation     Ambulation: Stairs   Ascend stairs: minimum assist  Pattern: non-reciprocal  Railings: one rail  Descend stairs: independent  Pattern: reciprocal  Railings: one rail  Curbs: unable    Observational Gait   Decreased walking speed.     Comments   Poor balance B.    General Comments:      Lumbar Comments  Fair body mechanics.         POC EXPIRES On:  5/17/24  PRECAUTIONS:  None  CO-MORBIDITES:  Old R brachial plexus injury   PERSONAL FACTORS:  None  Access Code [email or web address]      Manuals HEP 2/23           STM B upper glut                                                    Neuro Re-Ed     Supine PPT 2/23 5\" 20           Supine PPT with marching L/R             Quadruped hip ext L/R                                                                 Ther Ex    Supine crossed leg stretch L/R 2/23 15\" 5 ea           Figure 4 hip - SKTC stretch in supine L/R 2/23 15\" 5 ea           Bike             Seated physioball roll outs for trunk flex             Quadruped cat stretch                                                    Ther Activity                              Gait Training                              Modalities                                      "

## 2024-02-27 ENCOUNTER — OFFICE VISIT (OUTPATIENT)
Dept: PHYSICAL THERAPY | Facility: CLINIC | Age: 69
End: 2024-02-27
Payer: COMMERCIAL

## 2024-02-27 DIAGNOSIS — M54.16 RADICULOPATHY, LUMBAR REGION: Primary | ICD-10-CM

## 2024-02-27 DIAGNOSIS — G95.0 SYRINGOMYELIA (HCC): ICD-10-CM

## 2024-02-27 PROCEDURE — 97112 NEUROMUSCULAR REEDUCATION: CPT | Performed by: PHYSICAL THERAPIST

## 2024-02-27 PROCEDURE — 97110 THERAPEUTIC EXERCISES: CPT | Performed by: PHYSICAL THERAPIST

## 2024-02-27 NOTE — PROGRESS NOTES
"Daily Note     Today's date: 2024  Patient name: Corinne J Snyder  : 1955  MRN: [id number]  Referring provider: Kulwant Spaulding MD  Dx:   Encounter Diagnosis     ICD-10-CM    1. Radiculopathy, lumbar region  M54.16       2. Syringomyelia (HCC)  G95.0                      Subjective:  Pt reports feeling a little more R LB soreness today.        Objective:  See treatment diary below      Assessment:  Pt presented to outpatient physical therapy at [facility] with complaints of LB and B buttock and medial LE pain to her ankles.  She presents with decreased lumbar range of motion, decreased core and L>R LE strength, limited flexibility, poor postural awareness, poor body mechanics, poor B LE balance, decreased tolerance to activity and decreased functional mobility due to Radiculopathy, lumbar region, syringomyelia (HCC).  She feels much better with flexion based vs extension based positions.  She will continue to benefit from skilled PT services in order to address these deficits and reach maximum level of function.  Some difficulty with quadruped exercises due to chronic R UE injury and fatigue but no pain.  She will need much more endurance and strength to return to all desired activities.  She is motivated to improve.  Good response to STM with mod reduction in R lumbar tightness.       Plan:  Add quadruped fire hydrant B hips.  Progress to t-mill.       POC EXPIRES On:  24  PRECAUTIONS:  None  CO-MORBIDITES:  Old R brachial plexus injury   PERSONAL FACTORS:  None  Access Code [email or web address]      Manuals HEP           STM B lumbar paraspinals over pillow in prone   8'                                                 Neuro Re-Ed     Supine PPT  5\" 20 5\" 20          Supine PPT with marching L/R   15 ea          Quadruped hip ext L/R   3\" 10 ea                                                              Ther Ex    Supine crossed leg stretch L/R  15\" 5 ea 15\" 5 ea          Figure 4 hip " "- SKTC stretch in supine L/R 2/23 15\" 5 ea 15\" 5 ea          Bike   L1 6'          Seated physioball roll outs for trunk flex   [place] 10\" 15          Quadruped cat stretch 2/27  5\" 10          Supine B hip add with ball 2/27  5\" 20          Supine B hip ER with TB 2/27  [place] 5\" 20                       Ther Activity                              Gait Training                              Modalities                                           "

## 2024-02-29 ENCOUNTER — OFFICE VISIT (OUTPATIENT)
Dept: PHYSICAL THERAPY | Facility: CLINIC | Age: 69
End: 2024-02-29
Payer: COMMERCIAL

## 2024-02-29 DIAGNOSIS — G95.0 SYRINGOMYELIA (HCC): ICD-10-CM

## 2024-02-29 DIAGNOSIS — M54.16 RADICULOPATHY, LUMBAR REGION: Primary | ICD-10-CM

## 2024-02-29 PROCEDURE — 97110 THERAPEUTIC EXERCISES: CPT | Performed by: PHYSICAL THERAPIST

## 2024-02-29 PROCEDURE — 97112 NEUROMUSCULAR REEDUCATION: CPT | Performed by: PHYSICAL THERAPIST

## 2024-02-29 NOTE — PROGRESS NOTES
"Daily Note     Today's date: 2024  Patient name: Corinne J Snyder  : 1955  MRN: [id number]  Referring provider: Kulwant Spaulding MD  Dx:   Encounter Diagnosis     ICD-10-CM    1. Radiculopathy, lumbar region  M54.16       2. Syringomyelia (HCC)  G95.0                      Subjective:  Pt reports feeling some arm pain with some of the exercises last session due to her chronic R UE pain.  Wonders if some can be altered.          Objective:  See treatment diary below      Assessment:  Pt presented to outpatient physical therapy at [facility] with complaints of LB and B buttock and medial LE pain to her ankles.  She presents with decreased lumbar range of motion, decreased core and L>R LE strength, limited flexibility, poor postural awareness, poor body mechanics, poor B LE balance, decreased tolerance to activity and decreased functional mobility due to Radiculopathy, lumbar region, syringomyelia (HCC).  She feels much better with flexion based vs extension based positions.  She will continue to benefit from skilled PT services in order to address these deficits and reach maximum level of function.  Some exercises were eliminated today due to her chronic R UE injury and pain.  She will need much more endurance and strength to return to all desired activities.  She is motivated to improve.  Good response to STM with mod reduction in R lumbar tightness with only min on L.       Plan:  Continue to progress bike and t-mill time.  Avoid R UE WB'ing or pulling activities.       POC EXPIRES On:  24  PRECAUTIONS:  None  CO-MORBIDITES:  Old R brachial plexus injury - avoid R UE WB'ing  PERSONAL FACTORS:  None  Access Code [email or web address]      Manuals HEP          STM B lumbar paraspinals over pillow in prone   8' 8'                                                Neuro Re-Ed     Supine PPT  5\" 20 5\" 20 5\" 20         Supine PPT with marching L/R   15 ea 15 ea         Quadruped hip ext L/R   3\" " "10 ea D/C         Seated physioball hip flex L/R    Blue 20 ea         Seated physioball knee ext L/R    Blue 20 ea         Mini squats on bosu dome down                                       Ther Ex    Supine crossed leg stretch L/R 2/23 15\" 5 ea 15\" 5 ea 15\" 5 ea         Figure 4 hip - SKTC stretch in supine L/R 2/23 15\" 5 ea 15\" 5 ea 15\" 5 ea         Bike   L1 6' L1 7'         Seated physioball roll outs for trunk flex   Green 10\" 15 D/C         Quadruped cat stretch 2/27  5\" 10 5\" 10         Supine B hip add with ball 2/27  5\" 20 5\" 20         Supine B hip ER with TB 2/27  [place] 5\" 20 Plum 5\" 20         Standing TB hip ext L/R    OTB 20 ea         Standing TB hip abd L/R    OTB 20 ea                      Ther Activity    T-mill    1.1 mph 6'                      Gait Training                              Modalities                                           "

## 2024-03-04 ENCOUNTER — OFFICE VISIT (OUTPATIENT)
Dept: PHYSICAL THERAPY | Facility: CLINIC | Age: 69
End: 2024-03-04
Payer: COMMERCIAL

## 2024-03-04 DIAGNOSIS — G95.0 SYRINGOMYELIA (HCC): ICD-10-CM

## 2024-03-04 DIAGNOSIS — M54.16 RADICULOPATHY, LUMBAR REGION: Primary | ICD-10-CM

## 2024-03-04 PROCEDURE — 97110 THERAPEUTIC EXERCISES: CPT | Performed by: PHYSICAL THERAPIST

## 2024-03-04 PROCEDURE — 97112 NEUROMUSCULAR REEDUCATION: CPT | Performed by: PHYSICAL THERAPIST

## 2024-03-04 NOTE — PROGRESS NOTES
"Daily Note     Today's date: 3/4/2024  Patient name: Corinne J Snyder  : 1955  MRN: [id number]  Referring provider: Kulwant Spaulding MD  Dx:   Encounter Diagnosis     ICD-10-CM    1. Radiculopathy, lumbar region  M54.16       2. Syringomyelia (HCC)  G95.0                      Subjective:  Pt reports feeling pretty good.  Tried to do some water exercises and overdid it a little but OK now.         Objective:  See treatment diary below      Assessment:  Pt presented to outpatient physical therapy at [facility] with complaints of LB and B buttock and medial LE pain to her ankles.  She presents with decreased lumbar range of motion, decreased core and L>R LE strength, limited flexibility, poor postural awareness, poor body mechanics, poor B LE balance, decreased tolerance to activity and decreased functional mobility due to Radiculopathy, lumbar region, syringomyelia (HCC).  She feels much better with flexion based vs extension based positions.  She will continue to benefit from skilled PT services in order to address these deficits and reach maximum level of function.  Exercises that have more than minimal R UE weightbearing cause more pain due to her chronic R UE injury.  She will need much more endurance and strength to return to all desired activities.  She is motivated to improve.  Good response to STM with mod reduction in R mid to upper lumbar tightness.  Almost no L lumbar tightness today.         Plan:  Continue to progress core and B hip strength.  Avoid R UE WB'ing or R UE pulling activities.       POC EXPIRES On:  24  PRECAUTIONS:  None  CO-MORBIDITES:  Old R brachial plexus injury - avoid R UE WB'ing  PERSONAL FACTORS:  None  Access Code [email or web address]      Manuals HEP 2/23 2/27 2/29 3/4        STM B lumbar paraspinals over pillow in prone   8' 8' 8'                                               Neuro Re-Ed     Supine PPT  5\" 20 5\" 20 5\" 20 5\" 20        Supine PPT with marching L/R   15 ea 15 " "ea 20 ea        Quadruped hip ext L/R 2/27  3\" 10 ea D/C         Seated physioball hip flex L/R    Blue 20 ea Blue 20 ea        Seated physioball knee ext L/R    Blue 20 ea Blue 20 ea        Mini squats on bosu dome down     20                                  Ther Ex    Supine crossed leg stretch L/R 2/23 15\" 5 ea 15\" 5 ea 15\" 5 ea 15\" 5 ea        Figure 4 hip - SKTC stretch in supine L/R 2/23 15\" 5 ea 15\" 5 ea 15\" 5 ea 15\" 5 ea        Bike   L1 6' L1 7' L1 10'        Seated physioball roll outs for trunk flex   Green 10\" 15 D/C         Quadruped cat stretch 2/27  5\" 10 5\" 10 5\" 10        Supine B hip add with ball 2/27  5\" 20 5\" 20 5\" 20        Supine B hip ER with TB 2/27  [place] 5\" 20 Plum 5\" 20 Plum 5\" 20        Standing TB hip ext L/R    OTB 20 ea OTB 20 ea        Standing TB hip abd L/R    OTB 20 ea OTB 20 ea                     Ther Activity    T-mill    1.1 mph 6' 1.3 mph 5'                     Gait Training                              Modalities                                           "

## 2024-03-05 ENCOUNTER — RA CDI HCC (OUTPATIENT)
Dept: OTHER | Facility: HOSPITAL | Age: 69
End: 2024-03-05

## 2024-03-06 ENCOUNTER — OFFICE VISIT (OUTPATIENT)
Dept: PHYSICAL THERAPY | Facility: CLINIC | Age: 69
End: 2024-03-06
Payer: COMMERCIAL

## 2024-03-06 DIAGNOSIS — G95.0 SYRINGOMYELIA (HCC): ICD-10-CM

## 2024-03-06 DIAGNOSIS — M54.16 RADICULOPATHY, LUMBAR REGION: Primary | ICD-10-CM

## 2024-03-06 PROCEDURE — 97112 NEUROMUSCULAR REEDUCATION: CPT

## 2024-03-06 PROCEDURE — 97110 THERAPEUTIC EXERCISES: CPT

## 2024-03-06 PROCEDURE — 97140 MANUAL THERAPY 1/> REGIONS: CPT

## 2024-03-06 NOTE — PROGRESS NOTES
"Daily Note     Today's date: 3/6/2024  Patient name: Corinne J Snyder  : 1955  MRN: [id number]  Referring provider: Kulwant Spaulding MD  Dx:   Encounter Diagnosis     ICD-10-CM    1. Radiculopathy, lumbar region  M54.16       2. Syringomyelia (HCC)  G95.0                      Subjective:  Patient reports she was sore following last PT session.  Denies any radiating leg symptoms at this time.        Objective:  See treatment diary below      Assessment:  Pt presented to outpatient physical therapy at [facility] with complaints of LB and B buttock and medial LE pain to her ankles.  She presents with decreased lumbar range of motion, decreased core and L>R LE strength, limited flexibility, poor postural awareness, poor body mechanics, poor B LE balance, decreased tolerance to activity and decreased functional mobility due to Radiculopathy, lumbar region, syringomyelia (HCC).  She feels much better with flexion based vs extension based positions.  She will continue to benefit from skilled PT services in order to address these deficits and reach maximum level of function.  Exercises that have more than minimal R UE weightbearing cause more pain due to her chronic R UE injury.  She will need much more endurance and strength to return to all desired activities.  She is motivated to improve.  Good response to STM with mod reduction in R mid to upper lumbar tightness.          Plan:  Continue to progress core and B hip strength.  Avoid R UE WB'ing or R UE pulling activities.       POC EXPIRES On:  24  PRECAUTIONS:  None  CO-MORBIDITES:  Old R brachial plexus injury - avoid R UE WB'ing  PERSONAL FACTORS:  None  Access Code [email or web address]      Manuals HEP 2/23 2/27 2/29 3/4 3/6       STM B lumbar paraspinals over pillow in prone   8' 8' 8' 8'                                              Neuro Re-Ed     Supine PPT  5\" 20 5\" 20 5\" 20 5\" 20 5\" x20       Supine PPT with marching L/R   15 ea 15 ea 20 ea X20 ea     " "  Quadruped hip ext L/R 2/27  3\" 10 ea D/C         Seated physioball hip flex L/R    Blue 20 ea Blue 20 ea Blue x20 ea       Seated physioball knee ext L/R    Blue 20 ea Blue 20 ea Blue x20 ea       Mini squats on bosu dome down     20 x20                                 Ther Ex    Supine crossed leg stretch L/R 2/23 15\" 5 ea 15\" 5 ea 15\" 5 ea 15\" 5 ea 15\" 5 ea       Figure 4 hip - SKTC stretch in supine L/R 2/23 15\" 5 ea 15\" 5 ea 15\" 5 ea 15\" 5 ea 15\" x5 ea       Bike   L1 6' L1 7' L1 10' L1 10'       Seated physioball roll outs for trunk flex   Green 10\" 15 D/C         Quadruped cat stretch 2/27  5\" 10 5\" 10 5\" 10 5\" x10       Supine B hip add with ball 2/27  5\" 20 5\" 20 5\" 20 5\" x20       Supine B hip ER with TB 2/27  [place] 5\" 20 Plum 5\" 20 Plum 5\" 20 Plum 5\" x20       Standing TB hip ext L/R    OTB 20 ea OTB 20 ea OTB x20 ea       Standing TB hip abd L/R    OTB 20 ea OTB 20 ea OTB x20 ea                    Ther Activity    T-mill    1.1 mph 6' 1.3 mph 5' NV                    Gait Training                              Modalities                                           " Show Cetaphil Line: Yes Action 3: Continue Hold Regimen: Dial Soap Continue Regimen: Dupixent injections\\n\\ntriamcinolone acetonide 0.1 % topical cream BID- Apply twice daily throughout body for itchy irritated areas. Avoid face, underarms, and groin. Detail Level: Zone Discontinue Regimen: hydroxyzine HCl 25 mg tablet \\nQuantity: 30.0 Tablet  Days Supply: 30\\nSig: Take 1 tablet PO QHS for itching\\n\\nProtopic 0.1 % topical ointment \\nQuantity: 100.0 g  Days Supply: 30\\nSig: Apply topically to affected areas once daily Other Instructions: Previously tried Betamethasone, Lamisil & Zyrtec

## 2024-03-11 ENCOUNTER — OFFICE VISIT (OUTPATIENT)
Dept: PHYSICAL THERAPY | Facility: CLINIC | Age: 69
End: 2024-03-11
Payer: COMMERCIAL

## 2024-03-11 DIAGNOSIS — G95.0 SYRINGOMYELIA (HCC): ICD-10-CM

## 2024-03-11 DIAGNOSIS — M54.16 RADICULOPATHY, LUMBAR REGION: Primary | ICD-10-CM

## 2024-03-11 PROCEDURE — 97112 NEUROMUSCULAR REEDUCATION: CPT

## 2024-03-11 PROCEDURE — 97110 THERAPEUTIC EXERCISES: CPT

## 2024-03-11 PROCEDURE — 97140 MANUAL THERAPY 1/> REGIONS: CPT

## 2024-03-11 NOTE — PROGRESS NOTES
"Daily Note     Today's date: 3/11/2024  Patient name: Corinne J Snyder  : 1955  MRN: [id number]  Referring provider: Kulwant Spaulding MD  Dx:   Encounter Diagnosis     ICD-10-CM    1. Radiculopathy, lumbar region  M54.16       2. Syringomyelia (HCC)  G95.0           Start Time: 815  Stop Time: 905  Total time in clinic (min): 50 minutes    Subjective:  Reports that she has been feeling okay. No changes recently. Back is sore, denies radicular symptoms.       Objective:  See treatment diary below    [id number] self directed       Assessment:  Pt presented to outpatient physical therapy at [facility] with complaints of LB and B buttock and medial LE pain to her ankles.  She presents with decreased lumbar range of motion, decreased core and L>R LE strength, limited flexibility, poor postural awareness, poor body mechanics, poor B LE balance, decreased tolerance to activity and decreased functional mobility due to Radiculopathy, lumbar region, syringomyelia (HCC). Preferred to use recumbent bike compared to treadmill at beginning of session because the treadmill typically bothers her knees. Paraspinal STM focused primarily on R side as per patient report of increased soreness on that side. No increase in sx during session. Proximal hip and core stabilization impairments remain - continued to address these impairments as per plan with progressive overload as tolerated.     Plan:  Continue to progress core and B hip strength.  Avoid R UE WB'ing or R UE pulling activities.       POC EXPIRES On:  24  PRECAUTIONS:  None  CO-MORBIDITES:  Old R brachial plexus injury - avoid R UE WB'ing  PERSONAL FACTORS:  None  Access Code [email or web address]      Manuals HEP  3/ 3/6 3/11       STM B lumbar paraspinals over pillow in prone   8' 8' 8' 8' 8'                                              Neuro Re-Ed     Supine PPT  5\" 20 5\" 20 5\" 20 5\" 20 5\" x20 5\" x20       Supine PPT with marching L/R   15 ea 15 ea 20 ea " "X20 ea X20 ea       Quadruped hip ext L/R 2/27  3\" 10 ea D/C         Seated on physioball hip flex L/R    Blue 20 ea Blue 20 ea Blue x20 ea Blue x20       Seated on physioball knee ext L/R    Blue 20 ea Blue 20 ea Blue x20 ea Blue x20 ea       Mini squats on bosu dome down     20 x20                                 Ther Ex    Supine crossed leg stretch L/R 2/23 15\" 5 ea 15\" 5 ea 15\" 5 ea 15\" 5 ea 15\" 5 ea 20\" 5x ea       Figure 4 hip - SKTC stretch in supine L/R 2/23 15\" 5 ea 15\" 5 ea 15\" 5 ea 15\" 5 ea 15\" x5 ea 20\"x5 ea       Bike   L1 6' L1 7' L1 10' L1 10' L1 10'       Seated physioball roll outs for trunk flex   Green 10\" 15 D/C         Quadruped cat stretch 2/27  5\" 10 5\" 10 5\" 10 5\" x10 5\" x10       Supine B hip add with ball 2/27  5\" 20 5\" 20 5\" 20 5\" x20 5\" x20       Supine B hip ER with TB 2/27  [place] 5\" 20 Plum 5\" 20 Plum 5\" 20 Plum 5\" x20 Plum 5\" x20 (inc reps NV?)      Standing TB hip ext L/R    OTB 20 ea OTB 20 ea OTB x20 ea OTB x20 ea       Standing TB hip abd L/R    OTB 20 ea OTB 20 ea OTB x20 ea OTB x20 ea                                Ther Activity    T-mill    1.1 mph 6' 1.3 mph 5' NV                    Gait Training                              Modalities                                           "

## 2024-03-14 ENCOUNTER — OFFICE VISIT (OUTPATIENT)
Dept: PHYSICAL THERAPY | Facility: CLINIC | Age: 69
End: 2024-03-14
Payer: COMMERCIAL

## 2024-03-14 DIAGNOSIS — M54.16 RADICULOPATHY, LUMBAR REGION: Primary | ICD-10-CM

## 2024-03-14 DIAGNOSIS — G95.0 SYRINGOMYELIA (HCC): ICD-10-CM

## 2024-03-14 PROCEDURE — 97140 MANUAL THERAPY 1/> REGIONS: CPT | Performed by: PHYSICAL THERAPIST

## 2024-03-14 PROCEDURE — 97110 THERAPEUTIC EXERCISES: CPT | Performed by: PHYSICAL THERAPIST

## 2024-03-14 PROCEDURE — 97112 NEUROMUSCULAR REEDUCATION: CPT | Performed by: PHYSICAL THERAPIST

## 2024-03-14 NOTE — PROGRESS NOTES
"Daily Note     Today's date: 3/14/2024  Patient name: Corinne J Snyder  : 1955  MRN: [id number]  Referring provider: Kulwant Spaulding MD  Dx:   Encounter Diagnosis     ICD-10-CM    1. Radiculopathy, lumbar region  M54.16       2. Syringomyelia (HCC)  G95.0                      Subjective:  Pt reports that she has been feeling pretty good.  May be ready for tougher exercises.         Objective:  See treatment diary below      Assessment:  Pt presented to outpatient physical therapy at [facility] with complaints of LB and B buttock and medial LE pain to her ankles.  She presents with decreased lumbar range of motion, decreased core and L>R LE strength, limited flexibility, poor postural awareness, poor body mechanics, poor B LE balance, decreased tolerance to activity and decreased functional mobility due to Radiculopathy, lumbar region, syringomyelia (HCC).  Pt is staying motivated to improve.  She is ready for higher level core strengthening.  She may benefit from balance tasks.  Mod less R lumbar tightness today.           Plan:  Continue to progress core and B hip strength with higher level exercises next week.  Add balance tasks.  Likely D/C to HEP on 3/28/24.  Avoid R UE WB'ing or R UE pulling activities.       POC EXPIRES On:  24  PRECAUTIONS:  None  CO-MORBIDITES:  Old R brachial plexus injury - avoid R UE WB'ing  PERSONAL FACTORS:  Pt does not like using pillow in supine  Access Code [email or web address]      Manuals HEP  3/ 3/6 3/11  3/14     STM B lumbar paraspinals over pillow in prone   8' 8' 8' 8' 8'  8'                                            Neuro Re-Ed     Supine PPT  5\" 20 5\" 20 5\" 20 5\" 20 5\" x20 5\" x20  5\" 20     Supine PPT with marching L/R   15 ea 15 ea 20 ea X20 ea X20 ea  20 ea     Quadruped hip ext L/R   3\" 10 ea D/C         Seated on physioball hip flex L/R    Blue 20 ea Blue 20 ea Blue x20 ea Blue x20  Blue 20 ea     Seated on physioball knee ext L/R    Blue 20 " "ea Blue 20 ea Blue x20 ea Blue x20 ea  Blue 20 ea     Mini squats on bosu dome down     20 x20  20     TB rows B standing        Plum 20                  Ther Ex    Supine crossed leg stretch L/R 2/23 15\" 5 ea 15\" 5 ea 15\" 5 ea 15\" 5 ea 15\" 5 ea 20\" 5x ea  20\" 5 ea     Figure 4 hip - SKTC stretch in supine L/R 2/23 15\" 5 ea 15\" 5 ea 15\" 5 ea 15\" 5 ea 15\" x5 ea 20\"x5 ea  20\" 5 ea     Bike   L1 6' L1 7' L1 10' L1 10' L1 10'  L1 10'     Seated physioball roll outs for trunk flex   Green 10\" 15 D/C         Quadruped cat stretch 2/27  5\" 10 5\" 10 5\" 10 5\" x10 5\" x10  5\" 10     Supine B hip add with ball 2/27  5\" 20 5\" 20 5\" 20 5\" x20 5\" x20  5\" 25     Supine B hip ER with TB 2/27  [place] 5\" 20 Plum 5\" 20 Plum 5\" 20 Plum 5\" x20 Plum 5\" x20 (inc reps NV?) [place] 5\" 25     Standing TB hip ext L/R    OTB 20 ea OTB 20 ea OTB x20 ea OTB x20 ea  OTB 20 ea     Standing TB hip abd L/R    OTB 20 ea OTB 20 ea OTB x20 ea OTB x20 ea OTB 20 ea                               Ther Activity    T-mill    1.1 mph 6' 1.3 mph 5' NV D/C                   Gait Training                              Modalities                                           "

## 2024-03-18 ENCOUNTER — OFFICE VISIT (OUTPATIENT)
Dept: PHYSICAL THERAPY | Facility: CLINIC | Age: 69
End: 2024-03-18
Payer: COMMERCIAL

## 2024-03-18 DIAGNOSIS — G95.0 SYRINGOMYELIA (HCC): ICD-10-CM

## 2024-03-18 DIAGNOSIS — M54.16 RADICULOPATHY, LUMBAR REGION: Primary | ICD-10-CM

## 2024-03-18 PROCEDURE — 97140 MANUAL THERAPY 1/> REGIONS: CPT | Performed by: PHYSICAL THERAPIST

## 2024-03-18 PROCEDURE — 97110 THERAPEUTIC EXERCISES: CPT | Performed by: PHYSICAL THERAPIST

## 2024-03-18 PROCEDURE — 97112 NEUROMUSCULAR REEDUCATION: CPT | Performed by: PHYSICAL THERAPIST

## 2024-03-18 NOTE — PROGRESS NOTES
"Daily Note     Today's date: 3/18/2024  Patient name: Corinne J Snyder  : 1955  MRN: [id number]  Referring provider: Kulwant Spaulding MD  Dx:   Encounter Diagnosis     ICD-10-CM    1. Radiculopathy, lumbar region  M54.16       2. Syringomyelia (HCC)  G95.0                        Subjective:  Pt reports she walked more this weekend and had a bit more pain.           Objective:  See treatment diary below      Assessment:  Pt presented to outpatient physical therapy at [facility] with complaints of LB and B buttock and medial LE pain to her ankles.  She presents with decreased lumbar range of motion, decreased core and L>R LE strength, limited flexibility, poor postural awareness, poor body mechanics, poor B LE balance, decreased tolerance to activity and decreased functional mobility due to Radiculopathy, lumbar region, syringomyelia (HCC).  Pt is staying motivated to improve.  She performed better than expected with added balance tasks today.  R lumbar tightness is staying minimal.            Plan:  Continue to progress core and B hip strength with higher level exercises.  Likely D/C to HEP on 3/28/24.  Avoid R UE WB'ing or R UE pulling activities.       POC EXPIRES On:  24  PRECAUTIONS:  None  CO-MORBIDITES:  Old R brachial plexus injury - avoid R UE WB'ing  PERSONAL FACTORS:  Pt does not like using pillow in supine  Access Code [email or web address]      Manuals HEP  3/4 3/6 3/11  3/14 3/18    STM B lumbar paraspinals over pillow in prone   8' 8' 8' 8' 8'  8' 8'                                           Neuro Re-Ed     Supine PPT  5\" 20 5\" 20 5\" 20 5\" 20 5\" x20 5\" x20  5\" 20 5\" 20    Supine PPT with marching L/R   15 ea 15 ea 20 ea X20 ea X20 ea  20 ea 20 ea    Quadruped hip ext L/R   3\" 10 ea D/C         Seated on physioball hip flex L/R    Blue 20 ea Blue 20 ea Blue x20 ea Blue x20  Blue 20 ea Blue 20 ea    Seated on physioball knee ext L/R    Blue 20 ea Blue 20 ea Blue x20 ea Blue x20 ea  " "Blue 20 ea Blue 20 ea    Mini squats on bosu dome down     20 x20  20 20    Lunges onto bosu dome up - FWD L/R          15 ea    TB rows B standing        Plum 20 Plum 10                 Ther Ex    Supine crossed leg stretch L/R 2/23 15\" 5 ea 15\" 5 ea 15\" 5 ea 15\" 5 ea 15\" 5 ea 20\" 5x ea  20\" 5 ea 20\" 5 ea    Figure 4 hip - SKTC stretch in supine L/R 2/23 15\" 5 ea 15\" 5 ea 15\" 5 ea 15\" 5 ea 15\" x5 ea 20\"x5 ea  20\" 5 ea 20\" 5 ea    Bike   L1 6' L1 7' L1 10' L1 10' L1 10'  L1 10' L1 10'    Seated physioball roll outs for trunk flex   Green 10\" 15 D/C         Quadruped cat stretch 2/27  5\" 10 5\" 10 5\" 10 5\" x10 5\" x10  5\" 10 5\"10    Supine B hip add with ball 2/27  5\" 20 5\" 20 5\" 20 5\" x20 5\" x20  5\" 25 5\" 25    Supine B hip ER with TB 2/27  [place] 5\" 20 Plum 5\" 20 Plum 5\" 20 Plum 5\" x20 Plum 5\" x20 (inc reps NV?) [place] 5\" 25 Plum 5\" 25    Standing TB hip ext L/R    OTB 20 ea OTB 20 ea OTB x20 ea OTB x20 ea  OTB 20 ea OTB 20 ea - no UE support    Standing TB hip abd L/R    OTB 20 ea OTB 20 ea OTB x20 ea OTB x20 ea OTB 20 ea OTB 20 ea - no UE support                              Ther Activity    T-mill    1.1 mph 6' 1.3 mph 5' NV D/C                   Gait Training                              Modalities                                           "

## 2024-03-21 ENCOUNTER — OFFICE VISIT (OUTPATIENT)
Dept: PHYSICAL THERAPY | Facility: CLINIC | Age: 69
End: 2024-03-21
Payer: COMMERCIAL

## 2024-03-21 DIAGNOSIS — M54.16 RADICULOPATHY, LUMBAR REGION: Primary | ICD-10-CM

## 2024-03-21 PROCEDURE — 97140 MANUAL THERAPY 1/> REGIONS: CPT | Performed by: PHYSICAL THERAPIST

## 2024-03-21 PROCEDURE — 97110 THERAPEUTIC EXERCISES: CPT | Performed by: PHYSICAL THERAPIST

## 2024-03-21 PROCEDURE — 97112 NEUROMUSCULAR REEDUCATION: CPT | Performed by: PHYSICAL THERAPIST

## 2024-03-21 NOTE — PROGRESS NOTES
"Daily Note     Today's date: 3/21/2024  Patient name: Corinne J Snyder  : 1955  MRN: [id number]  Referring provider: Kulwant Spaulding MD  Dx:   Encounter Diagnosis     ICD-10-CM    1. Radiculopathy, lumbar region  M54.16                          Subjective:  Pt reports feeling a little better.            Objective:  See treatment diary below      Assessment:  Pt presented to outpatient physical therapy at [facility] with complaints of LB and B buttock and medial LE pain to her ankles.  She presents with decreased lumbar range of motion, decreased core and L>R LE strength, limited flexibility, poor postural awareness, poor body mechanics, poor B LE balance, decreased tolerance to activity and decreased functional mobility due to Radiculopathy, lumbar region, syringomyelia (HCC).  Pt is staying motivated to improve.  She is showing better stability with all exercises including standing activities with less lateral trunk flexion due to better strength.  R lumbar tightness is staying minimal.            Plan:  Continue to progress core and B hip strength with higher level exercises.  Likely D/C to HEP on 3/28/24.  Avoid R UE WB'ing or R UE pulling activities.       POC EXPIRES On:  24  PRECAUTIONS:  None  CO-MORBIDITES:  Old R brachial plexus injury - avoid R UE WB'ing  PERSONAL FACTORS:  Pt does not like using pillow in supine  Access Code [email or web address]      Manuals HEP  3/ 3/6 3/11  3/14 3/18 3/21     STM B lumbar paraspinals over pillow in prone   8' 8' 8' 8' 8'  8' 8' 8'                                                  Neuro Re-Ed       Supine PPT  5\" 20 5\" 20 5\" 20 5\" 20 5\" x20 5\" x20  5\" 20 5\" 20 5\" 20     Supine PPT with marching L/R   15 ea 15 ea 20 ea X20 ea X20 ea  20 ea 20 ea 20 ea     Quadruped hip ext L/R   3\" 10 ea D/C           Seated on physioball hip flex L/R    Blue 20 ea Blue 20 ea Blue x20 ea Blue x20  Blue 20 ea Blue 20 ea Blue 20 ea     Seated on physioball knee ext " "L/R    Blue 20 ea Blue 20 ea Blue x20 ea Blue x20 ea  Blue 20 ea Blue 20 ea Blue 20 ea     Mini squats on bosu dome down     20 x20  20 20 20     Lunges onto bosu dome up - FWD L/R          15 ea 15 ea     TB rows B standing        Plum 20 Plum 10 Plum 20                    Ther Ex      Supine crossed leg stretch L/R 2/23 15\" 5 ea 15\" 5 ea 15\" 5 ea 15\" 5 ea 15\" 5 ea 20\" 5x ea  20\" 5 ea 20\" 5 ea 20\" 5 ea     Figure 4 hip - SKTC stretch in supine L/R 2/23 15\" 5 ea 15\" 5 ea 15\" 5 ea 15\" 5 ea 15\" x5 ea 20\"x5 ea  20\" 5 ea 20\" 5 ea 20\" 5 ea     Bike   L1 6' L1 7' L1 10' L1 10' L1 10'  L1 10' L1 10' L1 10'     Seated physioball roll outs for trunk flex   Green 10\" 15 D/C           Quadruped cat stretch 2/27  5\" 10 5\" 10 5\" 10 5\" x10 5\" x10  5\" 10 5\" 10 5\" 10     Supine B hip add with ball 2/27  5\" 20 5\" 20 5\" 20 5\" x20 5\" x20  5\" 25 5\" 25 5\" 25     Supine B hip ER with TB 2/27  [place] 5\" 20 Plum 5\" 20 Plum 5\" 20 Plum 5\" x20 Plum 5\" x20 (inc reps NV?) [place] 5\" 25 Plum 5\" 25 Plum 5\" 25     Standing TB hip ext L/R    OTB 20 ea OTB 20 ea OTB x20 ea OTB x20 ea  OTB 20 ea OTB 20 ea - no UE support GTB 20 - no UE support     Standing TB hip abd L/R    OTB 20 ea OTB 20 ea OTB x20 ea OTB x20 ea OTB 20 ea OTB 20 ea - no UE support GTB 20 ea - no UE support                                   Ther Activity      T-mill    1.1 mph 6' 1.3 mph 5' NV D/C                       Gait Training                                    Modalities                                                 "

## 2024-03-25 ENCOUNTER — OFFICE VISIT (OUTPATIENT)
Dept: PHYSICAL THERAPY | Facility: CLINIC | Age: 69
End: 2024-03-25
Payer: COMMERCIAL

## 2024-03-25 DIAGNOSIS — G95.0 SYRINGOMYELIA (HCC): ICD-10-CM

## 2024-03-25 DIAGNOSIS — M54.16 RADICULOPATHY, LUMBAR REGION: Primary | ICD-10-CM

## 2024-03-25 PROCEDURE — 97112 NEUROMUSCULAR REEDUCATION: CPT | Performed by: PHYSICAL THERAPIST

## 2024-03-25 PROCEDURE — 97110 THERAPEUTIC EXERCISES: CPT | Performed by: PHYSICAL THERAPIST

## 2024-03-25 PROCEDURE — 97140 MANUAL THERAPY 1/> REGIONS: CPT | Performed by: PHYSICAL THERAPIST

## 2024-03-25 NOTE — PROGRESS NOTES
"Daily Note     Today's date: 3/25/2024  Patient name: Corinne J Snyder  : 1955  MRN: [id number]  Referring provider: Kulwant Spaulding MD  Dx:   Encounter Diagnosis     ICD-10-CM    1. Radiculopathy, lumbar region  M54.16       2. Syringomyelia (HCC)  G95.0                          Subjective:  Pt reports feeling a little better but gluts still tighten up at times.       Objective:  See treatment diary below      Assessment:  Pt presented to outpatient physical therapy at [facility] with complaints of LB and B buttock and medial LE pain to her ankles.  She presented with decreased lumbar range of motion, decreased core and L>R LE strength, limited flexibility, poor postural awareness, poor body mechanics, poor B LE balance, decreased tolerance to activity and decreased functional mobility due to Radiculopathy, lumbar region, syringomyelia (HCC).  Pt is staying motivated to improve.  She is showing better stability with all exercises especially in sitting and slightly better in standing with less lateral trunk flexion due to better strength.  R lumbar tightness is staying minimal.            Plan:  Likely D/C to HEP on 3/28/24.        POC EXPIRES On:  24  PRECAUTIONS:  None  CO-MORBIDITES:  Old R brachial plexus injury - avoid R UE WB'ing  PERSONAL FACTORS:  Pt does not like using pillow in supine  Access Code [email or web address]      Manuals HEP  3/ 3/6 3/11  3/14 3/18 3/21 3/25    STM B lumbar paraspinals over pillow in prone   8' 8' 8' 8' 8'  8' 8' 8' 8'                                                 Neuro Re-Ed       Supine PPT  5\" 20 5\" 20 5\" 20 5\" 20 5\" x20 5\" x20  5\" 20 5\" 20 5\" 20 5\" 20    Supine PPT with marching L/R   15 ea 15 ea 20 ea X20 ea X20 ea  20 ea 20 ea 20 ea 20 ea    Quadruped hip ext L/R   3\" 10 ea D/C           Seated on physioball hip flex L/R    Blue 20 ea Blue 20 ea Blue x20 ea Blue x20  Blue 20 ea Blue 20 ea Blue 20 ea Blue 20 ea    Seated on physioball knee ext " "L/R    Blue 20 ea Blue 20 ea Blue x20 ea Blue x20 ea  Blue 20 ea Blue 20 ea Blue 20 ea Blue 20 ea    Mini squats on bosu dome down     20 x20  20 20 20 20    Lunges onto bosu dome up - FWD L/R          15 ea 15 ea 15 ea    TB rows B standing        Plum 20 Plum 10 Plum 20 Plum 3x10                    Ther Ex      Supine crossed leg stretch L/R 2/23 15\" 5 ea 15\" 5 ea 15\" 5 ea 15\" 5 ea 15\" 5 ea 20\" 5x ea  20\" 5 ea 20\" 5 ea 20\" 5 ea 20\" 5 ea    Figure 4 hip - SKTC stretch in supine L/R 2/23 15\" 5 ea 15\" 5 ea 15\" 5 ea 15\" 5 ea 15\" x5 ea 20\"x5 ea  20\" 5 ea 20\" 5 ea 20\" 5 ea 20\" 5 ea    Bike   L1 6' L1 7' L1 10' L1 10' L1 10'  L1 10' L1 10' L1 10' L1 10'    Seated physioball roll outs for trunk flex   Green 10\" 15 D/C           Quadruped cat stretch 2/27  5\" 10 5\" 10 5\" 10 5\" x10 5\" x10  5\" 10 5\" 10 5\" 10 5\" 10    Supine B hip add with ball 2/27  5\" 20 5\" 20 5\" 20 5\" x20 5\" x20  5\" 25 5\" 25 5\" 25 5\" 25    Supine B hip ER with TB 2/27  [place] 5\" 20 Plum 5\" 20 Plum 5\" 20 Plum 5\" x20 Plum 5\" x20 (inc reps NV?) [place] 5\" 25 Plum 5\" 25 Plum 5\" 25 Plum 5\" 25     Standing TB hip ext L/R    OTB 20 ea OTB 20 ea OTB x20 ea OTB x20 ea  OTB 20 ea OTB 20 ea - no UE support GTB 20 - no UE support GTB 20 ea - no UE support    Standing TB hip abd L/R    OTB 20 ea OTB 20 ea OTB x20 ea OTB x20 ea OTB 20 ea OTB 20 ea - no UE support GTB 20 ea - no UE support GTB 20 ea - no UE support                                  Ther Activity      T-mill    1.1 mph 6' 1.3 mph 5' NV D/C                       Gait Training                                    Modalities                                                 "

## 2024-03-28 ENCOUNTER — OFFICE VISIT (OUTPATIENT)
Dept: PHYSICAL THERAPY | Facility: CLINIC | Age: 69
End: 2024-03-28
Payer: COMMERCIAL

## 2024-03-28 DIAGNOSIS — M54.16 RADICULOPATHY, LUMBAR REGION: Primary | ICD-10-CM

## 2024-03-28 DIAGNOSIS — G95.0 SYRINGOMYELIA (HCC): ICD-10-CM

## 2024-03-28 PROCEDURE — 97110 THERAPEUTIC EXERCISES: CPT

## 2024-03-28 PROCEDURE — 97140 MANUAL THERAPY 1/> REGIONS: CPT

## 2024-03-28 PROCEDURE — 97112 NEUROMUSCULAR REEDUCATION: CPT

## 2024-03-28 NOTE — PROGRESS NOTES
Daily Note     Today's date: 3/28/2024  Patient name: Corinne J Snyder  : 1955  MRN: [id number]  Referring provider: Kulwant Spaulding MD  Dx:   Encounter Diagnosis     ICD-10-CM    1. Radiculopathy, lumbar region  M54.16       2. Syringomyelia (HCC)  G95.0           Start Time: 0743  Stop Time: 0838  Total time in clinic (min): 55 minutes    Subjective: Patient arrives with radicular symptoms in BLEs.      Objective: See treatment diary below    Access Code: [email or web address]  URL: [email or web address]  Date: 2024  Prepared by: Amy Haynes    Exercises  - Supine Posterior Pelvic Tilt  - 1 x daily - 7 x weekly - 2 sets - 10 reps  - Mini Squat with Counter Support  - 1 x daily - 7 x weekly - 2 sets - 10 reps  - Supine Piriformis Stretch  - 1 x daily - 7 x weekly - 3 sets - 30 hold  - Supine Hip Adduction Isometric with Ball  - 1 x daily - 7 x weekly - 2 sets - 10 reps  - Standing Hip Abduction with Resistance at Ankles and Counter Support  - 1 x daily - 7 x weekly - 2 sets - 10 reps  - Standing Hip Extension with Resistance at Ankles and Counter Support  - 1 x daily - 7 x weekly - 2 sets - 10 reps  - Mini Lunge  - 1 x daily - 7 x weekly - 2 sets - 10 reps  - Supine March with Posterior Pelvic Tilt  - 1 x daily - 7 x weekly - 2 sets - 10 reps  - Hooklying Clamshell with Resistance  - 1 x daily - 7 x weekly - 2 sets - 10 reps  - Single Leg Bridge  - 1 x daily - 7 x weekly - 2 sets - 10 reps      Assessment:Patient tolerated treatment session well. Treatment session focused on improving core stability, hip girdle strength, and manuals in order to manage radicular sx. Occasional cues and demonstrations required to ensure proper technique/form was exhibited. TTP noted within R piriformis however, improved after completion of manual work. Patient transitioning from OP PT to HEP after today's session as she's met almost all PT goals. Updated and reviewed HEP prior to departure and patient exhibited good  "understanding. Informed patient to call clinic with any questions or if symptoms reproduce, patient verbalized understanding.       Plan: Continue per POC. Increase reps/resistance as tolerated.         POC EXPIRES On:  5/17/24  PRECAUTIONS:  None  CO-MORBIDITES:  Old R brachial plexus injury - avoid R UE WB'ing  PERSONAL FACTORS:  Pt does not like using pillow in supine  Access Code [email or web address]        Manuals HEP 2/23 2/27 2/29 3/4 3/6 3/11  3/14 3/18 3/21 3/25  3/28   STM B lumbar paraspinals over pillow in prone     8' 8' 8' 8' 8'  8' 8' 8' 8'  8'                                                                                       Neuro Re-Ed          Supine PPT 2/23 5\" 20 5\" 20 5\" 20 5\" 20 5\" x20 5\" x20  5\" 20 5\" 20 5\" 20 5\" 20  5\" 20   Supine PPT with marching L/R 2/27   15 ea 15 ea 20 ea X20 ea X20 ea  20 ea 20 ea 20 ea 20 ea 20 ea   Quadruped hip ext L/R 2/27   3\" 10 ea D/C                   Seated on physioball hip flex L/R       Blue 20 ea Blue 20 ea Blue x20 ea Blue x20  Blue 20 ea Blue 20 ea Blue 20 ea Blue 20 ea Blue 20 ea   Seated on physioball knee ext L/R       Blue 20 ea Blue 20 ea Blue x20 ea Blue x20 ea  Blue 20 ea Blue 20 ea Blue 20 ea Blue 20 ea  Blue 20 ea   Mini squats on bosu dome down         20 x20   20 20 20 20  20   Lunges onto bosu dome up - FWD L/R                  15 ea 15 ea 15 ea 15 ea   TB rows B standing               Plum 20 Plum 10 Plum 20 Plum 3x10   Plum 3x10                                Ther Ex         Supine crossed leg stretch L/R 2/23 15\" 5 ea 15\" 5 ea 15\" 5 ea 15\" 5 ea 15\" 5 ea 20\" 5x ea  20\" 5 ea 20\" 5 ea 20\" 5 ea 20\" 5 ea  20\" 5 ea   Figure 4 hip - SKTC stretch in supine L/R 2/23 15\" 5 ea 15\" 5 ea 15\" 5 ea 15\" 5 ea 15\" x5 ea 20\"x5 ea  20\" 5 ea 20\" 5 ea 20\" 5 ea 20\" 5 ea  20\" 5 ea   Bike     L1 6' L1 7' L1 10' L1 10' L1 10'  L1 10' L1 10' L1 10' L1 10'  L1 10'   Seated physioball roll outs for trunk flex     Green 10\" 15 D/C                   Quadruped cat stretch 2/27   " "5\" 10 5\" 10 5\" 10 5\" x10 5\" x10  5\" 10 5\" 10 5\" 10 5\" 10  5\" 10   Supine B hip add with ball 2/27   5\" 20 5\" 20 5\" 20 5\" x20 5\" x20  5\" 25 5\" 25 5\" 25 5\" 25  5\" 25   Supine B hip ER with TB 2/27   [place] 5\" 20 Plum 5\" 20 Plum 5\" 20 Plum 5\" x20 Plum 5\" x20 (inc reps NV?) [place] 5\" 25 Plum 5\" 25 Plum 5\" 25 Plum 5\" 25   Plum 5\" 25    Standing TB hip ext L/R       OTB 20 ea OTB 20 ea OTB x20 ea OTB x20 ea  OTB 20 ea OTB 20 ea - no UE support GTB 20 - no UE support GTB 20 ea - no UE support  GTB 20 ea - no UE support   Standing TB hip abd L/R       OTB 20 ea OTB 20 ea OTB x20 ea OTB x20 ea OTB 20 ea OTB 20 ea - no UE support GTB 20 ea - no UE support GTB 20 ea - no UE support  GTB 20 ea - no UE support                                                           Ther Activity         T-mill       1.1 mph 6' 1.3 mph 5' NV D/C                                         Gait Training                                                                 Modalities                                                                      "

## 2024-07-18 ENCOUNTER — TELEPHONE (OUTPATIENT)
Dept: PAIN MEDICINE | Facility: CLINIC | Age: 69
End: 2024-07-18

## 2024-07-18 ENCOUNTER — TELEPHONE (OUTPATIENT)
Age: 69
End: 2024-07-18

## 2024-07-18 NOTE — TELEPHONE ENCOUNTER
DO Alvarez Yoon  Cc: [facility]  Np/PA please          Previous Messages      ----- Message -----  From: Alvarez Silvestre  Sent: 7/18/2024  10:25 AM EDT  To: Thom Nash DO; *  Subject: Please Advise                                    Patient is schedule with you for a follow up back back for 8-7-24.    Patient was last see in Feb 2024. At that time she was to continue with the neurosurgeon and us as needed.    Would you be willing to see the patient or NP-PA-C?    S/w Pt to reschedule ov with NP/PA per Dr Martinez    Per Guide f/u ov go to NP or PA     8/7 appt cancelled

## 2024-07-18 NOTE — TELEPHONE ENCOUNTER
Caller: patient    Doctor: olivia      Reason for call: patient would like to know when she is able to schedule a procedure  If it would be after her MRI or after she FU with her neurosurgery   MRI is scheduled for sept      Also would like to know what would the OV be for if she schedule an ov with np or PA    Call back#:

## 2024-07-19 NOTE — TELEPHONE ENCOUNTER
Sw pt, questioning an injection with SL. Stated that she was scheduled for an ov with SL on 8/8/24 but was advised that had to be rescheduled with a NP / PA. Pt questioned if her MRI ordered by Neurosurgery has to be complete before she can schedule an injection. Advised pt, per her last ov note with sl - pt is to continue with Neurosurgery and contact this office if / when her pain returns. Offered pt an ov on 7/26 with MG arrive at 2:15. Pt verbalized understanding and appreciation. Will proceed as discussed.

## 2024-07-24 ENCOUNTER — DOCUMENTATION (OUTPATIENT)
Dept: GENETICS | Facility: CLINIC | Age: 69
End: 2024-07-24

## 2024-07-25 ENCOUNTER — CONSULT (OUTPATIENT)
Dept: GENETICS | Facility: CLINIC | Age: 69
End: 2024-07-25
Payer: COMMERCIAL

## 2024-07-25 ENCOUNTER — DOCUMENTATION (OUTPATIENT)
Dept: GENETICS | Facility: CLINIC | Age: 69
End: 2024-07-25

## 2024-07-25 ENCOUNTER — TELEPHONE (OUTPATIENT)
Dept: GENETICS | Facility: CLINIC | Age: 69
End: 2024-07-25

## 2024-07-25 DIAGNOSIS — Z12.11 SCREENING FOR COLON CANCER: ICD-10-CM

## 2024-07-25 DIAGNOSIS — Z80.0 FAMILY HISTORY OF COLON CANCER: Primary | ICD-10-CM

## 2024-07-25 PROCEDURE — 36415 COLL VENOUS BLD VENIPUNCTURE: CPT

## 2024-07-25 PROCEDURE — 96040 PR MEDICAL GENETICS COUNSELING EACH 30 MINUTES: CPT

## 2024-07-25 NOTE — PROGRESS NOTES
Pre-Test Genetic Counseling Consult Note    Patient Name: Corinne J Snyder   /Age: 1955/68 y.o.  Referring Provider:  Johnny Luna MD    Date of Service: 2024  Genetic Counselor: Maria E Tucker [place], [facility] [place]pretation Services: None  Location: In-person consult at [place]  Length of Visit: 60 Minutes    Corinne was referred to the [facility] Risk and Genetic Assessment Program due to her family history of colon, breast, bladder, prostate, pancreatic, gynecologic, and unspecified GI cancers . she presents today to discuss the possibility of a hereditary cancer syndrome, options for genetic testing, and implications for her and her family.     Cancer History and Treatment:   Personal History:   Oncology History    No history exists.       Screening Hx:   Breast:  Breast Imaging:   Mammogram (2023):   Impression: No mammographic evidence of malignancy.   Breast Biopsy: none   Breast Density: Scattered fibroglandular density     Colon:  Colonoscopy (): 1 polyp reported  F/u recommended in 5 years     Gynecologic:  Ovaries and Uterus intact     A.  Endometrium:    -Fragments of benign endometrial polyp .   -Background of inactive endometrium/ basalis endometrium   -No evidence of hyperplasia or carcinoma seen.     B.  Endometrial polyp:    -Fragments of lower  endometrial/ upper endocervical polyp with tubal   metaplasia.   -No evidence of malignancy seen.     Skin:  Dermatology appointment scheduled for 2024    Reproductive History  Age at menarche: 15  Age at first live birth:  41  Menopause: Post Menopausal (unk)  Hormone replacement: none     Medical and Surgical History  Pertinent surgical history:   Past Surgical History:   Procedure Laterality Date    COLONOSCOPY  07/15/2015    normal    COLONOSCOPY  2021    Dr Luna. Hyperplastic polyp removed.    CYSTOSCOPY  12/15/2014    last assesed 12/15/2014, managed by Jigar Arenas      Pertinent medical history:  Past  "Medical History:   Diagnosis Date    Arthritis     Brachial plexus injury     with permanent nerve damage     Endometrial hyperplasia, unspecified     resolved 2014    Pelvic floor dysfunction     Plantar fasciitis     Tachycardia     intermittent had full cardiac workup-benign    Uterine cyst     removed         Other History:  Height:   Ht Readings from Last 1 Encounters:   24 5' 6\" (1.676 m)     Weight:   Wt Readings from Last 1 Encounters:   24 77.1 kg (170 lb)       Relevant Family History   Patient reports possible Orthodox ancestry- her daughter is 4% Orthodox and it is unclear which side it was inherited from     Maternal Family History:  Corinne mentioned that many maternal relatives are not forthcoming regarding medical information- some relatives provided misleading details.  Family history is completed to the best of her knowledge     Mother: colon cancer w/ brain, lung, and adrenal mets diagnosed at 84; BCC; breast lumpectomies- no known cancer (d.84)   Uncle: pancreatic cancer diagnosed >50- exact age unknown ()   First-cousin (male): pancreatic cancer diagnosed >50- exact age unnown   ()  Aunt: breast and bone cancer diagnosed >50, unclear if met or second primary   ()   Aunt: gastric cancer   Uncle: prostate + bladder cancer diagnosed >50 (living)   First-cousin: breast cancer diagnosed <50, s/p bilateral mastectomy   Uncle: colon cancer diagnosed >50   Three uncles with \"GI\" cancer diagnosed >50, no info ()   First-cousin: unknown cancer, possible GI origin  Uncle: sinus cancer + neck mass ()  Uncle: colon cancer diagnosed > 50 ()   First-cousin: unknown cancer, possibly gynecologic (d.35)    First-cousin: uterine cancer    First-cousin (female): thyroid tumor + neck mass     Paternal Family History:  Father: BCC (d.90- Covid)     Please refer to the scanned pedigree in the Media Tab for a complete family history     *All history " is reported as provided by the patient; records are not available for review, except where indicated.     Assessment:  We discussed sporadic, familial and hereditary cancer.  We reviewed that only 5-10% of cancers are considered hereditary. Cancers such as lung cancer, cervical cancer, testicular cancer, and liver cancer very rarely have a hereditary etiology, and we cannot test for a genetic predisposition for these cancers at this time.  We also discussed the many factors that influence our risk for cancer such as age, environmental exposures, lifestyle choices and family history.      We reviewed the indications suggestive of a hereditary predisposition to cancer.    Corinne is unaffected, but is considering genetic testing due to a family history of mother cancer. Although Corinne's mother is the most informative person to undergo genetic testing, Corinne can consider genetic testing due to the following:   Patient does not have cancer but is the most informative person to test   because their mother is .    Genetic testing is indicated for Corinne based on the following criteria:   Meets NCCN V1.2023 Testing Criteria for the Evaluation of Cevallos syndrome: ?3 first-degree or second-degree relatives with LS-related cancersb regardless of age (mother and multiple maternal uncles with colon cancer)    The risks, benefits, and limitations of genetic testing were reviewed with the patient, as well as genetic discrimination laws, and possible test results (positive, negative, variants of uncertain significance) and their clinical implications. If positive for a mutation, options for managing cancer risk including increased surveillance, chemoprevention, and in some cases prophylactic surgery were discussed.     Corinne was informed that if a hereditary cancer syndrome was identified in her, first degree relatives (parents, siblings, and children) have a chance of also inheriting the condition. Genetic testing  "would allow for predictive genetic testing in other relatives, who may also be at risk depending on their degree of relation.     We reviewed that [facility] is a research protocol that offers population-based genetic screening that reports out pathogenic variants in genes that cause HBOC syndrome, Cevallos syndrome, and Familial Hypercholesterolemia only.  [facility] testing is different than the hereditary cancer testing offered through [facility].     Corinne mentioned she has a history of intermittent palpitations. She said many relatives have \"heart disease\" but has no other information. Corinne has been evaluated by a cardiologist in the past. We reviewed that there are conditions such as Burgada syndrome could cause irregular heart rhythm. We will provide cardiac genetic counseling.     Billing:  Most individuals pay <$100 for hereditary cancer genetic testing. If insurance covers the cost of the testing, individuals may still pay out of pocket secondary to co-pays, co-insurance, or deductibles. If the cost of the testing exceeds $100, the lab will reach out to the patient via phone or e-mail. The patient will then have the option to proceed with the testing, cancel the testing, or elect the self-pay option of $250. Corinne verbalized understanding.     Plan: Patient decided to proceed with testing and provided consent.    Summary:     Sample Collection:  The patient's blood sample was drawn in the office on 7/25/24 by genetic counseling assistant Ken Rm    Genetic Testing Preformed: [facility]-Expanded + RNA (71 genes): AIP, ALK, APC, LIBERTY, AXIN2, BAP1, BARD1, BMPR1A, BRCA1, BRCA2, BRIP1, CDC73, CDH1, CDK4, CDKN1B, CDKN2A, CHEK2, CTNNA1, DICER1, EGFR, EGLN1, EPCAM, FH, FLCN, GREM1, HOXB13, KIF1B, KIT, LZTR1, MAX, MEN1, MET, MITF, MLH1, MSH2, MSH3, MSH6, MUTYH, NF1, NF2, NTHL1, PALB2, PDGFRA, PHOX2B, PMS2, POLD1, POLE, POT1, WZWOH3V, PTCH1, PTEN, RAD51C, RAD51D, RB1, RET, SDHA, SDHAF2, SDHB, SDHC, " SDHD, SMAD4, SMARCA4, SMARCB1, SMARCE1, STK11, SUFU, ANKE136, TP53, TSC1, TSC2, VHL    Result Call Information:  In the event that we need to reach Corinne via telephone:  I confirmed the patient's mobile number on file as the best number to call with results  I confirmed with the patient that we can leave a voicemail on her mobile number    Results take approximately 2-3 weeks to complete once test is started.    Corinne will be notified via [employer] once results are available.      Additional recommendations for surveillance/medical management will be made pending genetic test results.

## 2024-07-25 NOTE — TELEPHONE ENCOUNTER
Pt calling in to report she has another aunt who had gastric cancer and wanted to let provider know about that as well.

## 2024-07-26 ENCOUNTER — OFFICE VISIT (OUTPATIENT)
Dept: PAIN MEDICINE | Facility: CLINIC | Age: 69
End: 2024-07-26
Payer: COMMERCIAL

## 2024-07-26 VITALS
WEIGHT: 166 LBS | HEIGHT: 66 IN | SYSTOLIC BLOOD PRESSURE: 138 MMHG | TEMPERATURE: 98.1 F | HEART RATE: 78 BPM | BODY MASS INDEX: 26.68 KG/M2 | DIASTOLIC BLOOD PRESSURE: 80 MMHG

## 2024-07-26 DIAGNOSIS — M47.816 LUMBAR SPONDYLOSIS: ICD-10-CM

## 2024-07-26 DIAGNOSIS — M48.061 LUMBAR FORAMINAL STENOSIS: ICD-10-CM

## 2024-07-26 DIAGNOSIS — M48.062 SPINAL STENOSIS OF LUMBAR REGION WITH NEUROGENIC CLAUDICATION: ICD-10-CM

## 2024-07-26 DIAGNOSIS — M54.16 LUMBAR RADICULOPATHY: Primary | ICD-10-CM

## 2024-07-26 PROCEDURE — 1159F MED LIST DOCD IN RCRD: CPT | Performed by: PHYSICIAN ASSISTANT

## 2024-07-26 PROCEDURE — 99214 OFFICE O/P EST MOD 30 MIN: CPT | Performed by: PHYSICIAN ASSISTANT

## 2024-07-26 PROCEDURE — 1160F RVW MEDS BY RX/DR IN RCRD: CPT | Performed by: PHYSICIAN ASSISTANT

## 2024-07-26 NOTE — PROGRESS NOTES
Assessment:  1. Lumbar radiculopathy    2. Spinal stenosis of lumbar region with neurogenic claudication    3. Lumbar foraminal stenosis    4. Lumbar spondylosis        Plan:  While the patient was in the office today, I did have a thorough conversation regarding their chronic pain syndrome, medication management, and treatment plan options.    After discussing options, the patient will be scheduled for a bilateral transforaminal epidural steroid injection.  Once I obtain the procedure note from [facility] I will be able to determine the levels of the injection, as to not repeat the same injection that was done because she did not obtain any relief.    I have recommended the patient take the gabapentin on a regular basis as it is not a as needed medication.    Follow up with neurosurgery as scheduled.     The patient will follow-up 4 weeks after the procedure. The patient was advised to contact the office should their symptoms worsen in the interim. The patient was agreeable and verbalized an understanding.        History of Present Illness:    The patient is a 68 y.o. female last seen on 2/7/2024 who presents for a follow up office visit in regards to chronic low back pain secondary to lumbar spondylosis, lumbar degenerative disc disease, spinal stenosis, foraminal stenosis and lumbar radiculopathy.  The patient currently reports a significant increase in low back pain with radiation into the buttocks as well as the medial thighs.  She rates her current pain a 7 out of 10 and describes it as a constant burning and shooting pain.  It is significantly worse with standing and walking.  She reports subjective weakness.  Patient denies falls, denies bowel or bladder incontinence, denies saddle anesthesia.  She underwent a lumbar epidural steroid injection in February at the [facility] and she was unable to note any improvement.  Patient is unsure of the approach of the injection and what level of the  injection.    I have personally reviewed and/or updated the patient's past medical history, past surgical history, family history, social history, current medications, allergies, and vital signs today.       Review of Systems:    Review of Systems   Respiratory:  Negative for shortness of breath.    Cardiovascular:  Negative for chest pain.   Gastrointestinal:  Negative for constipation, diarrhea, nausea and vomiting.   Musculoskeletal:  Positive for gait problem and joint swelling (Joint stiffness). Negative for arthralgias and myalgias.   Skin:  Negative for rash.   Neurological:  Negative for dizziness, seizures and weakness.   All other systems reviewed and are negative.        Past Medical History:   Diagnosis Date   • Arthritis    • Brachial plexus injury 1982    with permanent nerve damage    • Endometrial hyperplasia, unspecified     resolved 11/26/2014   • Pelvic floor dysfunction    • Plantar fasciitis    • Tachycardia     intermittent had full cardiac workup-benign   • Uterine cyst 2014    removed       Past Surgical History:   Procedure Laterality Date   • COLONOSCOPY  07/15/2015    normal   • COLONOSCOPY  06/2021    Dr Luna. Hyperplastic polyp removed.   • CYSTOSCOPY  12/15/2014    last assesed 12/15/2014, managed by Jigar Arenas       Family History   Problem Relation Age of Onset   • Colon cancer Mother 82        Metastatic   • No Known Problems Father    • Hyperlipidemia Brother    • Breast cancer Maternal Aunt    • Colon cancer Maternal Uncle    • Ovarian cancer Cousin    • Cancer Cousin         female c ancer   • Breast cancer Family    • Ovarian cancer Family    • Substance Abuse Neg Hx    • Mental illness Neg Hx    • Alcohol abuse Neg Hx        Social History     Occupational History   • Occupation: Retired - Homemaker   Tobacco Use   • Smoking status: Never   • Smokeless tobacco: Never   Vaping Use   • Vaping status: Never Used   Substance and Sexual Activity   • Alcohol use: No   • Drug  "use: No   • Sexual activity: Yes     Partners: Male         Current Outpatient Medications:   •  Acetaminophen (TYLENOL PO), Take by mouth if needed, Disp: , Rfl:   •  albuterol (ProAir HFA) 90 mcg/act inhaler, Inhale 2 puffs every 4 (four) hours as needed for wheezing or shortness of breath, Disp: 8.5 g, Rfl: 1  •  B Complex Vitamins (VITAMIN-B COMPLEX PO), Take by mouth, Disp: , Rfl:   •  Calcium Carb-Cholecalciferol 600-12.5 MG-MCG CAPS, Take by mouth, Disp: , Rfl:   •  Cholecalciferol 50 MCG (2000 UT) CAPS, Take 1 capsule by mouth, Disp: , Rfl:   •  methocarbamol (Robaxin-750) 750 mg tablet, Take 1 tablet (750 mg total) by mouth every 6 (six) hours as needed for muscle spasms (back pain), Disp: 90 tablet, Rfl: 3  •  Multiple Vitamins-Minerals (PRESERVISION AREDS 2+MULTI VIT PO), , Disp: , Rfl:   •  gabapentin (Neurontin) 300 mg capsule, Take 1 capsule (300 mg total) by mouth daily at bedtime (Patient not taking: Reported on 7/26/2024), Disp: 60 capsule, Rfl: 3  •  loratadine (CLARITIN) 10 mg tablet, Take 10 mg by mouth daily (Patient not taking: Reported on 2/2/2024), Disp: , Rfl:   •  Naproxen Sodium (ALEVE PO), Take by mouth as needed (Patient not taking: Reported on 2/7/2024), Disp: , Rfl:     Allergies   Allergen Reactions   • Amoxicillin Rash and Shortness Of Breath   • Ciprofloxacin Myalgia   • Darvon [Propoxyphene] Dizziness       Physical Exam:    /80 (BP Location: Left arm, Patient Position: Sitting, Cuff Size: Standard)   Pulse 78   Temp 98.1 °F (36.7 °C)   Ht 5' 6\" (1.676 m)   Wt 75.3 kg (166 lb)   LMP  (LMP Unknown)   BMI 26.79 kg/m²     Constitutional:normal, well developed, well nourished, alert, in no distress and non-toxic and no overt pain behavior.  Eyes:anicteric  HEENT:grossly intact  Neck:supple, symmetric, trachea midline and no masses   Pulmonary:even and unlabored  Cardiovascular:No edema or pitting edema present  Skin:Normal without rashes or lesions and well " hydrated  Psychiatric:Mood and affect appropriate  Neurologic:Cranial Nerves II-XII grossly intact  Musculoskeletal: Gait is slow but stable without the use of assistive devices      Imaging  MRI LUMBAR SPINE WITHOUT CONTRAST     INDICATION: M54.16: Radiculopathy, lumbar region  M48.062: Spinal stenosis, lumbar region with neurogenic claudication.     COMPARISON: X-rays dated 7/11/2013     TECHNIQUE:  Multiplanar, multisequence imaging of the lumbar spine was performed. .        IMAGE QUALITY:  Diagnostic     FINDINGS:     VERTEBRAL BODIES:  There are 5 lumbar type vertebral bodies. Minimal dextroscoliosis of the lumbar spine. Straightening of the normal lumbar lordosis. Slight anterior spondylolisthesis of L4 in relation to L3. No compression fracture. Type II fatty   endplate marrow degenerative changes at the L2-3, L3-4 and L4-5 levels. At T11 there is a chronic focal compression deformity of the central aspect of the superior endplate consistent with chronic Schmorl's node. No adjacent edema.     SACRUM:  Normal signal within the sacrum. No evidence of insufficiency or stress fracture.     DISTAL CORD AND CONUS:  Normal size and signal within the distal cord and conus.     PARASPINAL SOFT TISSUES:  Paraspinal soft tissues are unremarkable.     LOWER THORACIC DISC SPACES: At T11-12 there is minor degenerative disc disease with a tiny central disc herniation and mild facet arthropathy. No canal stenosis. Mild right foraminal narrowing.     LUMBAR DISC SPACES:     L1-L2: Slight loss of disc height and disc desiccation with mild annular bulging. No canal stenosis or foraminal narrowing.     L2-L3: Disc desiccation and loss of disc height. Mild annular bulging with a tiny central disc protrusion and mild anterior osteophyte formation. Minimal canal stenosis without cauda equina impingement. No foraminal narrowing.     L3-L4: Disc desiccation and loss of disc height. Annular bulging asymmetric towards the left with a  broad-based left subarticular and foraminal disc protrusion distorting the left anterior lateral aspect of the thecal sac. Correlate for left L4   radiculopathy. Minor foraminal narrowing with no L3 nerve impingement.     L4-L5: Loss of disc height with disc desiccation and moderate diffuse annular bulging. There is a small central and right paramedian disc herniation. There is a focal right foraminal disc extrusion extending superiorly into the neural foramen. Mild   ligamentum flavum thickening. Moderate tricompartmental canal stenosis with distortion of the right anterior lateral aspect of the thecal sac in particular. There is also mild left but more pronounced moderate right foraminal narrowing with mild mass   effect upon the exiting nerve. Correlate for right L4 radiculopathy.     L5-S1: Normal disc height and signal. No disc herniation, canal stenosis or foraminal narrowing. Minor facet arthropathy on the right.     OTHER FINDINGS:  None.     IMPRESSION:     Thoracolumbar degenerative disc disease, most pronounced at L4-5 where there are disc herniations distorting the right anterior lateral aspect of the thecal sac and displacing the L4 nerve within the neural foramen. Correlate for right L4 and L5   radiculopathy.     Degenerative disc disease at the L3-4 level asymmetric towards the left with narrowing of the left anterior lateral recess. Correlate for left L4 radiculopathy.  FL spine and pain procedure    (Results Pending)         Orders Placed This Encounter   Procedures   • FL spine and pain procedure

## 2024-08-02 ENCOUNTER — TELEPHONE (OUTPATIENT)
Dept: PAIN MEDICINE | Facility: CLINIC | Age: 69
End: 2024-08-02

## 2024-08-06 ENCOUNTER — OFFICE VISIT (OUTPATIENT)
Dept: DERMATOLOGY | Facility: CLINIC | Age: 69
End: 2024-08-06
Payer: COMMERCIAL

## 2024-08-06 VITALS — HEIGHT: 66 IN | WEIGHT: 166 LBS | TEMPERATURE: 97.1 F | BODY MASS INDEX: 26.68 KG/M2

## 2024-08-06 DIAGNOSIS — L57.0 KERATOSIS, ACTINIC: Primary | ICD-10-CM

## 2024-08-06 PROCEDURE — 99203 OFFICE O/P NEW LOW 30 MIN: CPT | Performed by: DERMATOLOGY

## 2024-08-06 NOTE — PATIENT INSTRUCTIONS
SEBORRHEIC KERATOSES-scattered,trunk,extremities   - Relevant exam: Scattered over the trunk/extremities are waxy brown to black plaques and papules with stuck on appearance and consistent dermoscopy  - Exam and clinical history consistent with seborrheic keratoses  - Counseled that these are benign growths that do not require treatment    MELANOCYTIC NEVI  -Relevant exam: Scattered over the trunk/extremities are homogenously pigmented brown macules and papules. ELM performed and without concerning findings. No outliers unless otherwise noted in today's note  - Exam and clinical history consistent with melanocytic nevi  - Counseled to return to clinic prior to scheduled appointment should any of these lesions change or should any new lesions of concern arise  - Counseled on use of sun protection daily. Reviewed latest FDA sunscreen guidelines, including use of broad spectrum (UVA and UVB blocking) sunscreen or sun protective clothing with SPF 30-50 every 2-3 hours and reapplied after exposure to water    LENTIGINES-trunk,extremities   OTHER SKIN CHANGES DUE TO CHRONIC EXPOSURE TO NONIONIZING RADIATION  - Relevant exam: Over sun exposed areas are brown macules. ELM performed and without concerning findings.  - Exam and clinical history consistent with lentigines.  - Counseled to return to clinic prior to scheduled appointment should any of these lesions change or should any new lesions of concern arise.  - Recommended use of sunscreen as above and below.    CHERRY ANGIOMAS  - Relevant exam: Scattered over the trunk/extremities are red papules  - Exam and clinical history consistent with cherry angiomas  - Educated that these are benign        ACTINIC KERATOSES  - Relevant exam: On the scattered are scaly pink macules without palpable dermal component    - Exam and clinical history consistent with actinic keratoses  - Discussed that these lesions are considered premalignant with the potential to evolve into squamous  cell carcinoma.   - Discussed treatment options, which may inclue liquid nitrogen destruction, topical immunotherapy including risks, benefits  - Patient counseled to return to the office in 4-6 weeks after completion of treatment for recheck if not resolved at which time retreatment or biopsy to rule out SCC will be determined based on clinic findings    -Follow up yearly for skin exam  Continue to practice Suncreen protection SPF 30+  -Recommends lotions with Omero hydroxy and Am lactin

## 2024-08-06 NOTE — PROGRESS NOTES
"[facility] Clinic Note     Patient Name: Corinne J Snyder  Encounter Date: 08/06/24     Have you been cared for by a [facility] Dermatologist in the last 3 years and, if so, which description applies to you?    NO.   I am considered a \"new\" patient and must complete all patient intake questions. I am FEMALE/of child-bearing potential.    REVIEW OF SYSTEMS:  Have you recently had or currently have any of the following? Recent fever or chills? No  Any non-healing wound? No  Are you pregnant or planning to become pregnant? No  Are you currently or planning to be nursing or breast feeding? No   PAST MEDICAL HISTORY:  Have you personally ever had or currently have any of the following?  If \"YES,\" then please provide more detail. Skin cancer (such as Melanoma, Basal Cell Carcinoma, Squamous Cell Carcinoma?  No  Tuberculosis, HIV/AIDS, Hepatitis B or C: No  Radiation Treatment No   HISTORY OF IMMUNOSUPPRESSION:   Do you have a history of any of the following:  Systemic Immunosuppression such as Diabetes, Biologic or Immunotherapy, Chemotherapy, Organ Transplantation, Bone Marrow Transplantation?  No    Answering \"YES\" requires the addition of the dotphrase \"IMMUNOSUPPRESSED\" as the first diagnosis of the patient's visit.   FAMILY HISTORY:  Any \"first degree relatives\" (parent, brother, sister, or child) with the following?    Skin Cancer, Pancreatic or Other Cancer? YES, dad BCC    PATIENT EXPERIENCE:    Do you want the Dermatologist to perform a COMPLETE skin exam today including a clinical examination under the \"bra and underwear\" areas?  NO  If necessary, do we have your permission to call and leave a detailed message on your Preferred Phone number that includes your specific medical information?  Yes      Allergies   Allergen Reactions   • Amoxicillin Rash and Shortness Of Breath   • Ciprofloxacin Myalgia   • Darvon [Propoxyphene] Dizziness      Current Outpatient Medications:   •  Acetaminophen (TYLENOL " PO), Take by mouth if needed, Disp: , Rfl:   •  albuterol (ProAir HFA) 90 mcg/act inhaler, Inhale 2 puffs every 4 (four) hours as needed for wheezing or shortness of breath, Disp: 8.5 g, Rfl: 1  •  B Complex Vitamins (VITAMIN-B COMPLEX PO), Take by mouth, Disp: , Rfl:   •  Calcium Carb-Cholecalciferol 600-12.5 MG-MCG CAPS, Take by mouth, Disp: , Rfl:   •  Cholecalciferol 50 MCG (2000 UT) CAPS, Take 1 capsule by mouth, Disp: , Rfl:   •  gabapentin (Neurontin) 300 mg capsule, Take 1 capsule (300 mg total) by mouth daily at bedtime, Disp: 60 capsule, Rfl: 3  •  methocarbamol (Robaxin-750) 750 mg tablet, Take 1 tablet (750 mg total) by mouth every 6 (six) hours as needed for muscle spasms (back pain), Disp: 90 tablet, Rfl: 3  •  Multiple Vitamins-Minerals (PRESERVISION AREDS 2+MULTI VIT PO), , Disp: , Rfl:   •  loratadine (CLARITIN) 10 mg tablet, Take 10 mg by mouth daily (Patient not taking: Reported on 2/2/2024), Disp: , Rfl:   •  Naproxen Sodium (ALEVE PO), Take by mouth as needed (Patient not taking: Reported on 8/6/2024), Disp: , Rfl:           Whom besides the patient is providing clinical information about today's encounter?   NO ADDITIONAL HISTORIAN (patient alone provided history)    Physical Exam and Assessment/Plan by Diagnosis:      Patient here today for spot check, patient reports having a hx of AK on face that had cryotherapy done 6 months ago and scattered on body,     SKIN CHECK;  SEBORRHEIC KERATOSES-scattered,trunk,extremities   - Relevant exam: Scattered over the trunk/extremities are waxy brown to black plaques and papules with stuck on appearance and consistent dermoscopy  - Exam and clinical history consistent with seborrheic keratoses  - Counseled that these are benign growths that do not require treatment    MELANOCYTIC NEVI  -Relevant exam: Scattered over the trunk/extremities are homogenously pigmented brown macules and papules. ELM performed and without concerning findings. No outliers unless  otherwise noted in today's note  - Exam and clinical history consistent with melanocytic nevi  - Counseled to return to clinic prior to scheduled appointment should any of these lesions change or should any new lesions of concern arise  - Counseled on use of sun protection daily. Reviewed latest FDA sunscreen guidelines, including use of broad spectrum (UVA and UVB blocking) sunscreen or sun protective clothing with SPF 30-50 every 2-3 hours and reapplied after exposure to water    LENTIGINES-trunk,extremities   OTHER SKIN CHANGES DUE TO CHRONIC EXPOSURE TO NONIONIZING RADIATION  - Relevant exam: Over sun exposed areas are brown macules. ELM performed and without concerning findings.  - Exam and clinical history consistent with lentigines.  - Counseled to return to clinic prior to scheduled appointment should any of these lesions change or should any new lesions of concern arise.  - Recommended use of sunscreen as above and below.    CHERRY ANGIOMAS  - Relevant exam: Scattered over the trunk/extremities are red papules  - Exam and clinical history consistent with cherry angiomas  - Educated that these are benign        ACTINIC KERATOSES  - Relevant exam: On the scattered are scaly pink macules without palpable dermal component    - Exam and clinical history consistent with actinic keratoses  - Discussed that these lesions are considered premalignant with the potential to evolve into squamous cell carcinoma.   - Discussed treatment options, which may inclue liquid nitrogen destruction, topical immunotherapy including risks, benefits  - Patient counseled to return to the office in 4-6 weeks after completion of treatment for recheck if not resolved at which time retreatment or biopsy to rule out SCC will be determined based on clinic findings    -Follow up yearly for skin exam  -Continue to practice Suncreen protection SPF 30+  -Recommends lotions with Omero hydroxy and Am lactin      Scribe Attestation    I,:  Delmis  BERONICA Tamez am acting as a scribe while in the presence of the attending physician.:       I,:  Lewis Beatty MD personally performed the services described in this documentation    as scribed in my presence.:

## 2024-08-28 ENCOUNTER — HOSPITAL ENCOUNTER (OUTPATIENT)
Dept: RADIOLOGY | Facility: CLINIC | Age: 69
Discharge: HOME | End: 2024-08-28
Payer: COMMERCIAL

## 2024-08-28 VITALS
HEART RATE: 70 BPM | TEMPERATURE: 97.1 F | RESPIRATION RATE: 18 BRPM | OXYGEN SATURATION: 97 % | DIASTOLIC BLOOD PRESSURE: 82 MMHG | SYSTOLIC BLOOD PRESSURE: 136 MMHG

## 2024-08-28 DIAGNOSIS — M54.16 LUMBAR RADICULOPATHY: ICD-10-CM

## 2024-08-28 PROCEDURE — 64483 NJX AA&/STRD TFRM EPI L/S 1: CPT | Performed by: ANESTHESIOLOGY

## 2024-08-28 RX ORDER — PAPAVERINE HCL 150 MG
15 CAPSULE, EXTENDED RELEASE ORAL ONCE
Status: COMPLETED | OUTPATIENT
Start: 2024-08-28 | End: 2024-08-28

## 2024-08-28 RX ADMIN — DEXAMETHASONE SODIUM PHOSPHATE 15 MG: 10 INJECTION, SOLUTION INTRAMUSCULAR; INTRAVENOUS at 13:49

## 2024-08-28 RX ADMIN — IOHEXOL 2 ML: 300 INJECTION, SOLUTION INTRAVENOUS at 13:48

## 2024-08-28 NOTE — H&P
History of Present Illness: The patient is a 68 y.o. female who presents with complaints of low back and leg pain.    Past Medical History:   Diagnosis Date    Arthritis     Brachial plexus injury 1982    with permanent nerve damage     Endometrial hyperplasia, unspecified     resolved 11/26/2014    Pelvic floor dysfunction     Plantar fasciitis     Tachycardia     intermittent had full cardiac workup-benign    Uterine cyst 2014    removed       Past Surgical History:   Procedure Laterality Date    COLONOSCOPY  07/15/2015    normal    COLONOSCOPY  06/2021    Dr Luna. Hyperplastic polyp removed.    CYSTOSCOPY  12/15/2014    last assesed 12/15/2014, managed by Jigar Arenas         Current Outpatient Medications:     Acetaminophen (TYLENOL PO), Take by mouth if needed, Disp: , Rfl:     albuterol (ProAir HFA) 90 mcg/act inhaler, Inhale 2 puffs every 4 (four) hours as needed for wheezing or shortness of breath, Disp: 8.5 g, Rfl: 1    B Complex Vitamins (VITAMIN-B COMPLEX PO), Take by mouth, Disp: , Rfl:     Calcium Carb-Cholecalciferol 600-12.5 MG-MCG CAPS, Take by mouth, Disp: , Rfl:     Cholecalciferol 50 MCG (2000 UT) CAPS, Take 1 capsule by mouth, Disp: , Rfl:     gabapentin (Neurontin) 300 mg capsule, Take 1 capsule (300 mg total) by mouth daily at bedtime, Disp: 60 capsule, Rfl: 3    loratadine (CLARITIN) 10 mg tablet, Take 10 mg by mouth daily (Patient not taking: Reported on 2/2/2024), Disp: , Rfl:     methocarbamol (Robaxin-750) 750 mg tablet, Take 1 tablet (750 mg total) by mouth every 6 (six) hours as needed for muscle spasms (back pain), Disp: 90 tablet, Rfl: 3    Multiple Vitamins-Minerals (PRESERVISION AREDS 2+MULTI VIT PO), , Disp: , Rfl:     Naproxen Sodium (ALEVE PO), Take by mouth as needed (Patient not taking: Reported on 8/6/2024), Disp: , Rfl:     Current Facility-Administered Medications:     dexamethasone (PF) (DECADRON) injection 15 mg, 15 mg, Epidural, Once, Thom Nash DO    iohexol  (OMNIPAQUE) 300 mg/mL injection 2 mL, 2 mL, Epidural, Once, Thom Nash DO    Allergies   Allergen Reactions    Amoxicillin Rash and Shortness Of Breath    Ciprofloxacin Myalgia    Darvon [Propoxyphene] Dizziness       Physical Exam:   General: Awake, Alert, Oriented x 3, Mood and affect appropriate  Respiratory: Respirations even and unlabored  Cardiovascular: Peripheral pulses intact; no edema  Musculoskeletal Exam: Decreased range of motion lumbar spine    ASA Score: III         Assessment:   1. Lumbar radiculopathy        Plan: Bilateral L5 TFESI

## 2024-08-28 NOTE — DISCHARGE INSTRUCTIONS
Epidural Steroid Injection   WHAT YOU NEED TO KNOW:   An epidural steroid injection (MATTHEW) is a procedure to inject steroid medicine into the epidural space. The epidural space is between your spinal cord and vertebrae. Steroids reduce inflammation and fluid buildup in your spine that may be causing pain. You may be given pain medicine along with the steroids.          ACTIVITY  Do not drive or operate machinery today.  No strenuous activity today - bending, lifting, etc.  You may resume normal activites starting tomorrow - start slowly and as tolerated.  You may shower today, but no tub baths or hot tubs.  You may have numbness for several hours from the local anesthetic. Please use caution and common sense, especially with weight-bearing activities.    CARE OF THE INJECTION SITE  If you have soreness or pain, apply ice to the area today (20 minutes on/20 minutes off).  Starting tomorrow, you may use warm, moist heat or ice if needed.  You may have an increase or change in your discomfort for 36-48 hours after your treatment.  Apply ice and continue with any pain medication you have been prescribed.  Notify [facility] if you have any of the following: redness, drainage, swelling, headache, stiff neck or fever above 100°F.    SPECIAL INSTRUCTIONS  Our office will contact you in approximately 14 days for a progress report.    MEDICATIONS  Continue to take all routine medications.  Our office may have instructed you to hold some medications.    As no general anesthesia was used in today's procedure, you should not experience any side effects related to anesthesia.     If you are diabetic, the steroids used in today's injection may temporarily increase your blood sugar levels after the first few days after your injection. Please keep a close eye on your sugars and alert the doctor who manages your diabetes if your sugars are significantly high from your baseline or you are symptomatic.     If you have a  problem specifically related to your procedure, please call our office at [phone].  Problems not related to your procedure should be directed to your primary care physician.

## 2024-09-04 DIAGNOSIS — M54.16 RADICULOPATHY, LUMBAR REGION: ICD-10-CM

## 2024-09-04 RX ORDER — GABAPENTIN 300 MG/1
300 CAPSULE ORAL 2 TIMES DAILY
Qty: 60 CAPSULE | Refills: 3 | Status: SHIPPED | OUTPATIENT
Start: 2024-09-04

## 2024-09-04 RX ORDER — GABAPENTIN 300 MG/1
300 CAPSULE ORAL
Qty: 60 CAPSULE | Refills: 3 | Status: SHIPPED | OUTPATIENT
Start: 2024-09-04 | End: 2024-09-04 | Stop reason: SDUPTHER

## 2024-09-11 ENCOUNTER — TELEPHONE (OUTPATIENT)
Dept: RADIOLOGY | Facility: MEDICAL CENTER | Age: 69
End: 2024-09-11

## 2024-09-11 NOTE — TELEPHONE ENCOUNTER
Caller: pt    Doctor: clarisa    Reason for call: 30% improvement, pain level 8. Right hip keeps giving out.    Call back#: [phone]

## 2024-09-17 ENCOUNTER — APPOINTMENT (OUTPATIENT)
Dept: RADIOLOGY | Facility: CLINIC | Age: 69
End: 2024-09-17
Payer: COMMERCIAL

## 2024-09-17 DIAGNOSIS — M25.551 RIGHT HIP PAIN: Primary | ICD-10-CM

## 2024-09-17 DIAGNOSIS — M25.551 RIGHT HIP PAIN: ICD-10-CM

## 2024-09-17 PROCEDURE — 73502 X-RAY EXAM HIP UNI 2-3 VIEWS: CPT

## 2024-09-18 ENCOUNTER — TELEPHONE (OUTPATIENT)
Dept: PAIN MEDICINE | Facility: CLINIC | Age: 69
End: 2024-09-18

## 2024-09-18 DIAGNOSIS — M54.16 LUMBAR RADICULOPATHY: Primary | ICD-10-CM

## 2024-09-18 NOTE — TELEPHONE ENCOUNTER
S/w pt and advised of the same, provided central scheduling # . Pt verbalized understanding and appreciative of call.

## 2024-09-18 NOTE — TELEPHONE ENCOUNTER
----- Message from Darshana Ingram PA-C sent at 9/18/2024  9:39 AM EDT -----  Please let patient know her right hip xray is normal.  Pain is likely emanating from her lumbar spine.  Can RN ask if patient has had an EMG?

## 2024-09-18 NOTE — TELEPHONE ENCOUNTER
S/w pt and advised of xray results.  Pt has not had an emg of lower extremity. Nurse advised pt nurse to inform [facility] and [facility] with recommendations.  Pain was intense for pt last night but more improved today.  Pt was appreciative of call.

## 2024-09-18 NOTE — TELEPHONE ENCOUNTER
EMG order entered.  Please explain to patient this test is being ordered so that we can determine which nerve is the root cause of her hip and leg pain.

## 2024-09-18 NOTE — TELEPHONE ENCOUNTER
Caller: Corinne     Doctor: Saad    Reason for call: Patient returning call from nurse     Call back#: [phone]

## 2024-09-25 ENCOUNTER — OFFICE VISIT (OUTPATIENT)
Dept: PAIN MEDICINE | Facility: CLINIC | Age: 69
End: 2024-09-25
Payer: COMMERCIAL

## 2024-09-25 VITALS
BODY MASS INDEX: 26.52 KG/M2 | DIASTOLIC BLOOD PRESSURE: 82 MMHG | HEIGHT: 66 IN | SYSTOLIC BLOOD PRESSURE: 128 MMHG | HEART RATE: 74 BPM | WEIGHT: 165 LBS | TEMPERATURE: 97.6 F

## 2024-09-25 DIAGNOSIS — M47.816 LUMBAR SPONDYLOSIS: ICD-10-CM

## 2024-09-25 DIAGNOSIS — M48.061 LUMBAR FORAMINAL STENOSIS: ICD-10-CM

## 2024-09-25 DIAGNOSIS — M25.562 CHRONIC PAIN OF LEFT KNEE: ICD-10-CM

## 2024-09-25 DIAGNOSIS — M51.26 LUMBAR DISC HERNIATION: ICD-10-CM

## 2024-09-25 DIAGNOSIS — M54.16 LUMBAR RADICULOPATHY: Primary | ICD-10-CM

## 2024-09-25 DIAGNOSIS — M25.551 RIGHT HIP PAIN: ICD-10-CM

## 2024-09-25 DIAGNOSIS — G89.29 CHRONIC PAIN OF LEFT KNEE: ICD-10-CM

## 2024-09-25 PROCEDURE — 99214 OFFICE O/P EST MOD 30 MIN: CPT | Performed by: PHYSICIAN ASSISTANT

## 2024-09-25 NOTE — PROGRESS NOTES
Assessment:  1. Lumbar radiculopathy    2. Lumbar foraminal stenosis    3. Lumbar disc herniation    4. Lumbar spondylosis    5. Chronic pain of left knee    6. Right hip pain        Plan:  While the patient was in the office today, I did have a thorough conversation regarding their chronic pain syndrome, medication management, and treatment plan options.    The patient is status post bilateral L5 transforaminal epidural steroid injections and notes significant improvement in the posterior gluteal pain that she was experiencing in addition to the spasms.  Unfortunately, she has persistent pain that radiates along the L4 dermatomal distribution in the right lower extremity.  She also has concerns that there could be some underlying hip issue.  Hip x-ray done recently was within normal limits.    I have recommended the patient continue with the current plan of an EMG of the lower extremities.  Consider right L4 transforaminal epidural steroid injection.  Patient was advised to follow-up with neurosurgery as scheduled in a few weeks.    X-ray of the left hip.  Referral to Dr. Banks.  Patient states that she was told by Ines that she would require total knee arthroplasty at some point.    Continue exercise program.  Patient does well with water therapy.    Once we have the EMG results I will contact her to discuss potential treatment options.    The patient was advised to contact the office should their symptoms worsen in the interim. The patient was agreeable and verbalized an understanding.        History of Present Illness:    The patient is a 68 y.o. female last seen on 8/28/2024 who presents for a follow up office visit in regards to chronic low back pain secondary to lumbar spondylosis, lumbar degenerative disc disease/disc herniations with foraminal stenosis.   The patient currently reports chronic low back pain with radiation to the lower extremities.  Patient is status post bilateral L5 transforaminal  epidural steroid injections done on 8/28/2024 and she reports significant improvement in the left-sided radicular pain as well as significant improvement in the pain that she was experiencing in the gluteal region with associated spasms.  The spasms have been reduced in frequency and severity.  She has what she calls fairly new pain in the right hip area with radiation down the lateral and anterior aspect of the right lower extremity into the top of the foot.  An x-ray of the right hip joint done recently was within normal limits.  She still has significant difficulty going up and down stairs due to the leg symptoms/weakness.  Despite the pain, she continues to be active with her exercise program and the water.  The patient is scheduled for an EMG.  She has a neurosurgery follow-up visit in a few weeks and also an MRI of the thoracic spine scheduled for Monday.  Patient also notes that she has problems with chronic left knee pain and is requesting an orthopedic evaluation.  She had seen her orthopedic Dr. Chacon in the past who told her that she may need knee surgery at some point.    I have personally reviewed and/or updated the patient's past medical history, past surgical history, family history, social history, current medications, allergies, and vital signs today.       Review of Systems:    Review of Systems   Respiratory:  Negative for shortness of breath.    Cardiovascular:  Negative for chest pain.   Gastrointestinal:  Negative for constipation, diarrhea, nausea and vomiting.   Musculoskeletal:  Positive for gait problem. Negative for arthralgias, joint swelling and myalgias.   Skin:  Negative for rash.   Neurological:  Negative for dizziness, seizures and weakness.   All other systems reviewed and are negative.        Past Medical History:   Diagnosis Date    Arthritis     Brachial plexus injury 1982    with permanent nerve damage     Endometrial hyperplasia, unspecified     resolved 11/26/2014    Pelvic  floor dysfunction     Plantar fasciitis     Tachycardia     intermittent had full cardiac workup-benign    Uterine cyst 2014    removed       Past Surgical History:   Procedure Laterality Date    COLONOSCOPY  07/15/2015    normal    COLONOSCOPY  06/2021    Dr Luna. Hyperplastic polyp removed.    CYSTOSCOPY  12/15/2014    last assesed 12/15/2014, managed by Jigar Arenas       Family History   Problem Relation Age of Onset    Colon cancer Mother 82        Metastatic    No Known Problems Father     Hyperlipidemia Brother     Breast cancer Maternal Aunt     Colon cancer Maternal Uncle     Ovarian cancer Cousin     Cancer Cousin         female c ancer    Breast cancer Family     Ovarian cancer Family     Substance Abuse Neg Hx     Mental illness Neg Hx     Alcohol abuse Neg Hx        Social History     Occupational History    Occupation: Retired - Homemaker   Tobacco Use    Smoking status: Never    Smokeless tobacco: Never   Vaping Use    Vaping status: Never Used   Substance and Sexual Activity    Alcohol use: No    Drug use: No    Sexual activity: Yes     Partners: Male         Current Outpatient Medications:     albuterol (ProAir HFA) 90 mcg/act inhaler, Inhale 2 puffs every 4 (four) hours as needed for wheezing or shortness of breath, Disp: 8.5 g, Rfl: 1    B Complex Vitamins (VITAMIN-B COMPLEX PO), Take by mouth, Disp: , Rfl:     Calcium Carb-Cholecalciferol 600-12.5 MG-MCG CAPS, Take by mouth, Disp: , Rfl:     Cholecalciferol 50 MCG (2000 UT) CAPS, Take 1 capsule by mouth, Disp: , Rfl:     Multiple Vitamins-Minerals (PRESERVISION AREDS 2+MULTI VIT PO), , Disp: , Rfl:     Naproxen Sodium (ALEVE PO), Take by mouth as needed, Disp: , Rfl:     Acetaminophen (TYLENOL PO), Take by mouth if needed (Patient not taking: Reported on 9/25/2024), Disp: , Rfl:     loratadine (CLARITIN) 10 mg tablet, Take 10 mg by mouth daily (Patient not taking: Reported on 2/2/2024), Disp: , Rfl:     Allergies   Allergen Reactions     "Amoxicillin Rash and Shortness Of Breath    Ciprofloxacin Myalgia    Darvon [Propoxyphene] Dizziness       Physical Exam:    /82 (BP Location: Left arm, Patient Position: Sitting, Cuff Size: Standard)   Pulse 74   Temp 97.6 °F (36.4 °C)   Ht 5' 6\" (1.676 m)   Wt 74.8 kg (165 lb)   LMP  (LMP Unknown)   BMI 26.63 kg/m²     Constitutional:normal, well developed, well nourished, alert, in no distress and non-toxic and no overt pain behavior.  Eyes:anicteric  HEENT:grossly intact  Neck:supple, symmetric, trachea midline and no masses   Pulmonary:even and unlabored  Cardiovascular:No edema or pitting edema present  Skin:Normal without rashes or lesions and well hydrated  Psychiatric:Mood and affect appropriate  Neurologic:Cranial Nerves II-XII grossly intact  Musculoskeletal: Gait is slightly antalgic, weakness of the right lower extremity, tender right sacroiliac joint      Imaging  MRI LUMBAR SPINE WITHOUT CONTRAST     INDICATION: M54.16: Radiculopathy, lumbar region  M48.062: Spinal stenosis, lumbar region with neurogenic claudication.     COMPARISON: X-rays dated 7/11/2013     TECHNIQUE:  Multiplanar, multisequence imaging of the lumbar spine was performed. .        IMAGE QUALITY:  Diagnostic     FINDINGS:     VERTEBRAL BODIES:  There are 5 lumbar type vertebral bodies. Minimal dextroscoliosis of the lumbar spine. Straightening of the normal lumbar lordosis. Slight anterior spondylolisthesis of L4 in relation to L3. No compression fracture. Type II fatty   endplate marrow degenerative changes at the L2-3, L3-4 and L4-5 levels. At T11 there is a chronic focal compression deformity of the central aspect of the superior endplate consistent with chronic Schmorl's node. No adjacent edema.     SACRUM:  Normal signal within the sacrum. No evidence of insufficiency or stress fracture.     DISTAL CORD AND CONUS:  Normal size and signal within the distal cord and conus.     PARASPINAL SOFT TISSUES:  Paraspinal soft " tissues are unremarkable.     LOWER THORACIC DISC SPACES: At T11-12 there is minor degenerative disc disease with a tiny central disc herniation and mild facet arthropathy. No canal stenosis. Mild right foraminal narrowing.     LUMBAR DISC SPACES:     L1-L2: Slight loss of disc height and disc desiccation with mild annular bulging. No canal stenosis or foraminal narrowing.     L2-L3: Disc desiccation and loss of disc height. Mild annular bulging with a tiny central disc protrusion and mild anterior osteophyte formation. Minimal canal stenosis without cauda equina impingement. No foraminal narrowing.     L3-L4: Disc desiccation and loss of disc height. Annular bulging asymmetric towards the left with a broad-based left subarticular and foraminal disc protrusion distorting the left anterior lateral aspect of the thecal sac. Correlate for left L4   radiculopathy. Minor foraminal narrowing with no L3 nerve impingement.     L4-L5: Loss of disc height with disc desiccation and moderate diffuse annular bulging. There is a small central and right paramedian disc herniation. There is a focal right foraminal disc extrusion extending superiorly into the neural foramen. Mild   ligamentum flavum thickening. Moderate tricompartmental canal stenosis with distortion of the right anterior lateral aspect of the thecal sac in particular. There is also mild left but more pronounced moderate right foraminal narrowing with mild mass   effect upon the exiting nerve. Correlate for right L4 radiculopathy.     L5-S1: Normal disc height and signal. No disc herniation, canal stenosis or foraminal narrowing. Minor facet arthropathy on the right.     OTHER FINDINGS:  None.     IMPRESSION:     Thoracolumbar degenerative disc disease, most pronounced at L4-5 where there are disc herniations distorting the right anterior lateral aspect of the thecal sac and displacing the L4 nerve within the neural foramen. Correlate for right L4 and L5    radiculopathy.     Degenerative disc disease at the L3-4 level asymmetric towards the left with narrowing of the left anterior lateral recess. Correlate for left L4 radiculopathy.  XR knee 3 vw left non injury    (Results Pending)         Orders Placed This Encounter   Procedures    XR knee 3 vw left non injury    Ambulatory referral to Orthopedic Surgery

## 2024-10-02 ENCOUNTER — HOSPITAL ENCOUNTER (OUTPATIENT)
Dept: MRI IMAGING | Facility: HOSPITAL | Age: 69
Discharge: HOME | End: 2024-10-02
Attending: NEUROLOGICAL SURGERY
Payer: COMMERCIAL

## 2024-10-02 DIAGNOSIS — M54.16 RADICULOPATHY, LUMBAR REGION: ICD-10-CM

## 2024-10-02 DIAGNOSIS — G95.0 SYRINGOMYELIA (HCC): ICD-10-CM

## 2024-10-02 PROCEDURE — 72157 MRI CHEST SPINE W/O & W/DYE: CPT

## 2024-10-02 PROCEDURE — A9585 GADOBUTROL INJECTION: HCPCS | Performed by: NEUROLOGICAL SURGERY

## 2024-10-02 RX ORDER — GADOBUTROL 604.72 MG/ML
7 INJECTION INTRAVENOUS
Status: COMPLETED | OUTPATIENT
Start: 2024-10-02 | End: 2024-10-02

## 2024-10-02 RX ADMIN — GADOBUTROL 7 ML: 604.72 INJECTION INTRAVENOUS at 14:12

## 2024-10-08 ENCOUNTER — OFFICE VISIT (OUTPATIENT)
Dept: NEUROSURGERY | Facility: CLINIC | Age: 69
End: 2024-10-08
Payer: COMMERCIAL

## 2024-10-08 VITALS
SYSTOLIC BLOOD PRESSURE: 122 MMHG | DIASTOLIC BLOOD PRESSURE: 78 MMHG | BODY MASS INDEX: 26.52 KG/M2 | RESPIRATION RATE: 16 BRPM | WEIGHT: 165 LBS | TEMPERATURE: 97.7 F | OXYGEN SATURATION: 99 % | HEART RATE: 76 BPM | HEIGHT: 66 IN

## 2024-10-08 DIAGNOSIS — G95.0 SYRINX OF SPINAL CORD (HCC): ICD-10-CM

## 2024-10-08 DIAGNOSIS — M54.16 LUMBAR RADICULOPATHY: Primary | ICD-10-CM

## 2024-10-08 PROCEDURE — 99215 OFFICE O/P EST HI 40 MIN: CPT | Performed by: PHYSICIAN ASSISTANT

## 2024-10-08 NOTE — ASSESSMENT & PLAN NOTE
Pt presents for 1 year follow up for thoracic syrinx.     Imaging reviewed personally and by attending.   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 This has a maximal AP and transverse diameter of 2.9 and 2.9 mm at the T6 level. When measured in a similar manner on the 9/26/2023 examination 3.3 x 3.1 mm and appears to be diminished in size in the interval. No other spinal cord abnormality evident.    Plan   Given stable to slight decrease in the thoracic syrinx, no neurosurgical intervention is anticipated at this time.    Again discussed with patient, that this is a benign process and neurosurgical intervention is anticipated.   Further follow-up for thoracic syrinx-as needed

## 2024-10-08 NOTE — PROGRESS NOTES
Ambulatory Visit  Name: Corinne J Snyder      : 1955      MRN: [id number]  Encounter Provider: Kulwant Spaulding MD  Encounter Date: 10/8/2024   Encounter department: [facility] [place]    Assessment & Plan  Lumbar radiculopathy    Pt previously seen for back pain by neurosurgery last year.   Reports since bilateral L5 TESI on  24, has new radicular pain RLE laterally/ anteiror shins with associated weakness in the RLE.     Prior MRI lumbar spine reviewed 23: Thoracolumbar degenerative disc disease, most pronounced at L4-5 where there are disc herniations distorting the right anterior lateral aspect of the thecal sac and displacing the L4 nerve within the neural foramen. Degenerative disc disease at the L3-4 level     Plan     Recommend continued conservative management management with pain management and PT as needed.  Provided new referral to PT given the increased weakness in the right lower extremity and ambulatory dysfunction.  Recommend patient follow-up with orthopedics for the right hip and left knee symptoms and continue to exhaust conservative management with pain management and PT as tolerated.  If symptoms persist or worsen despite conservative measures, patient to follow-up with neurosurgery with updated MRI of the lumbar spine given the new RLE radicular pain laterally and anterior shins and RLE weakness.  Pt requested specifically to see Dr. Spaulding at the next follow up should conservative measures fail.     Orders:    Ambulatory Referral to Physical Therapy; Future    MRI lumbar spine without contrast; Future    Syrinx of spinal cord (HCC)  Pt presents for 1 year follow up for thoracic syrinx.     Imaging reviewed personally and by attending.   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 This has a maximal AP and transverse diameter of 2.9 and 2.9 mm at the T6 level. When measured in  a similar manner on the 9/26/2023 examination 3.3 x 3.1 mm and appears to be diminished in size in the interval. No other spinal cord abnormality evident.    Plan   Given stable to slight decrease in the thoracic syrinx, no neurosurgical intervention is anticipated at this time.    Again discussed with patient, that this is a benign process and neurosurgical intervention is anticipated.   Further follow-up for thoracic syrinx-as needed           History of Present Illness     With past medical history of brachial plexus injury with permanent nerve damage involving the right arm in the 1970s limiting right arm and shoulder ROM who presents for 1 year follow up for thoracic syringomyelia with updated MRI T spine. Pt was also seen last year by neurosurgery for low back pain.     Patient denies mid back pain or radicular pain the T spine.  Patient reports that she continues to have low back pain.  Patient reports that since her last visit, she has had decrease in the spasms in bilateral buttocks especially after injections.  However, patient reports that after her last spinal injections in August 2024, she now has radicular pain on the lateral aspect of the right lower extremity as well as pain in bilateral anterior shins.  She reports that she feels like she has shin splints.  Patient reports that these new symptoms are different than what she previously had which was pain on the medial bilateral thighs and medial lower legs.  Patient reports associated weakness in the right lower extremity and reports that at times she feels like her right leg will give out.  Patient reports that she has no issues going down the stairs, but reports that she has issues going up the stairs particularly using the right leg.  Patient also reports difficulty walking due to her right leg symptoms.  Patient reports tingling in the right lateral ankle and foot.  Patient denies any new changes in her bowel or bladder function.  She reports  that she has a history of bladder prolapse and has chronic incomplete emptying of her urine.  She denies any urinary or bowel incontinence.         .       Review of Systems   Gastrointestinal: Negative.    Genitourinary: Negative.    Musculoskeletal:  Positive for back pain (center low back, R>L buttocks, right hip and lateral leg to lower leg, foot or toe) and myalgias (shins).        Right leg heavy, left leg improved post TFESI 8/28/2024   Neurological:  Positive for weakness (right leg) and numbness (occasional tingling lower right leg, leg gets very cold).   Psychiatric/Behavioral:  Positive for sleep disturbance.      I have personally reviewed the MA's review of systems and made changes as necessary.    Pertinent Medical History     Medical History Reviewed by provider this encounter:       Past Medical History   Past Medical History:   Diagnosis Date    Arthritis     Brachial plexus injury 1982    with permanent nerve damage     Endometrial hyperplasia, unspecified     resolved 11/26/2014    Pelvic floor dysfunction     Plantar fasciitis     Tachycardia     intermittent had full cardiac workup-benign    Uterine cyst 2014    removed     Past Surgical History:   Procedure Laterality Date    COLONOSCOPY  07/15/2015    normal    COLONOSCOPY  06/2021    Dr Luna. Hyperplastic polyp removed.    CYSTOSCOPY  12/15/2014    last assesed 12/15/2014, managed by Jigar Arenas     Family History   Problem Relation Age of Onset    Colon cancer Mother 82        Metastatic    No Known Problems Father     Hyperlipidemia Brother     Breast cancer Maternal Aunt     Colon cancer Maternal Uncle     Ovarian cancer Cousin     Cancer Cousin         female c ancer    Breast cancer Family     Ovarian cancer Family     Substance Abuse Neg Hx     Mental illness Neg Hx     Alcohol abuse Neg Hx      Current Outpatient Medications on File Prior to Visit   Medication Sig Dispense Refill    albuterol (ProAir HFA) 90 mcg/act inhaler  Inhale 2 puffs every 4 (four) hours as needed for wheezing or shortness of breath 8.5 g 1    B Complex Vitamins (VITAMIN-B COMPLEX PO) Take by mouth      Calcium Carb-Cholecalciferol 600-12.5 MG-MCG CAPS Take by mouth      Cholecalciferol 50 MCG (2000 UT) CAPS Take 1 capsule by mouth      loratadine (CLARITIN) 10 mg tablet Take 10 mg by mouth as needed      Multiple Vitamins-Minerals (PRESERVISION AREDS 2+MULTI VIT PO)       Naproxen Sodium (ALEVE PO) Take by mouth as needed      Acetaminophen (TYLENOL PO) Take by mouth if needed (Patient not taking: Reported on 9/25/2024)       No current facility-administered medications on file prior to visit.     Allergies   Allergen Reactions    Amoxicillin Rash and Shortness Of Breath    Ciprofloxacin Myalgia    Darvon [Propoxyphene] Dizziness      Current Outpatient Medications on File Prior to Visit   Medication Sig Dispense Refill    albuterol (ProAir HFA) 90 mcg/act inhaler Inhale 2 puffs every 4 (four) hours as needed for wheezing or shortness of breath 8.5 g 1    B Complex Vitamins (VITAMIN-B COMPLEX PO) Take by mouth      Calcium Carb-Cholecalciferol 600-12.5 MG-MCG CAPS Take by mouth      Cholecalciferol 50 MCG (2000 UT) CAPS Take 1 capsule by mouth      loratadine (CLARITIN) 10 mg tablet Take 10 mg by mouth as needed      Multiple Vitamins-Minerals (PRESERVISION AREDS 2+MULTI VIT PO)       Naproxen Sodium (ALEVE PO) Take by mouth as needed      Acetaminophen (TYLENOL PO) Take by mouth if needed (Patient not taking: Reported on 9/25/2024)       No current facility-administered medications on file prior to visit.      Social History     Tobacco Use    Smoking status: Never    Smokeless tobacco: Never   Vaping Use    Vaping status: Never Used   Substance and Sexual Activity    Alcohol use: No    Drug use: No    Sexual activity: Yes     Partners: Male     Objective     /78 (BP Location: Left arm, Patient Position: Sitting, Cuff Size: Standard)   Pulse 76   Temp  "97.7 °F (36.5 °C) (Temporal)   Resp 16   Ht 5' 6\" (1.676 m)   Wt 74.8 kg (165 lb)   LMP  (LMP Unknown)   SpO2 99%   BMI 26.63 kg/m²     Physical Exam  Constitutional:       General: She is not in acute distress.     Appearance: She is well-developed.   HENT:      Head: Normocephalic and atraumatic.   Eyes:      Extraocular Movements: Extraocular movements intact.      Pupils: Pupils are equal, round, and reactive to light.   Neck:      Trachea: No tracheal deviation.   Cardiovascular:      Rate and Rhythm: Normal rate.   Pulmonary:      Effort: Pulmonary effort is normal.   Abdominal:      Palpations: Abdomen is soft.      Tenderness: There is no abdominal tenderness. There is no guarding.   Musculoskeletal:      Cervical back: Normal range of motion and neck supple.   Skin:     General: Skin is warm and dry.      Coloration: Skin is not pale.   Neurological:      Mental Status: She is alert and oriented to person, place, and time.      Comments: GCS 15, Awake, Alert, Oriented x 3    Motor: CREWS, strength RUE SF 2/5 (Remote hx of shoulder injury), EF 4/5, IO 4/5. LUE 5/5, RLE HF 4-/5, KE/KF 4/5, DF/PF 4+/5, LLE 4+/5.     Sensation: Decreased to PP RUE > LUE. Decreased right anterior and lateral LE, intact to PP LLE.     Reflexes:2+ throughout except LUE 3+, Left kohli's, no clonus     Coordination: no drift LUE, drift RUE due to limited ROM of right shoulder.          Psychiatric:         Behavior: Behavior normal.       Neurologic Exam     Mental Status   Oriented to person, place, and time.     Cranial Nerves     CN III, IV, VI   Pupils are equal, round, and reactive to light.      Administrative Statements   I have spent a total time of 45 minutes in caring for this patient on the day of the visit/encounter including Diagnostic results, Risks and benefits of tx options, Instructions for management, Patient and family education, Importance of tx compliance, Risk factor reductions, Impressions, Counseling / " Coordination of care, Documenting in the medical record, Reviewing / ordering tests, medicine, procedures  , Obtaining or reviewing history  , and Communicating with other healthcare professionals .

## 2024-10-08 NOTE — ASSESSMENT & PLAN NOTE
Pt previously seen for back pain by neurosurgery last year.   Reports since bilateral L5 TESI on  08/28/24, has new radicular pain RLE laterally/ anteiror shins with associated weakness in the RLE.     Prior MRI lumbar spine reviewed 12/20/23: Thoracolumbar degenerative disc disease, most pronounced at L4-5 where there are disc herniations distorting the right anterior lateral aspect of the thecal sac and displacing the L4 nerve within the neural foramen. Degenerative disc disease at the L3-4 level     Plan     Recommend continued conservative management management with pain management and PT as needed.  Provided new referral to PT given the increased weakness in the right lower extremity and ambulatory dysfunction.  Recommend patient follow-up with orthopedics for the right hip and left knee symptoms and continue to exhaust conservative management with pain management and PT as tolerated.  If symptoms persist or worsen despite conservative measures, patient to follow-up with neurosurgery with updated MRI of the lumbar spine given the new RLE radicular pain laterally and anterior shins and RLE weakness.  Pt requested specifically to see Dr. Spaulding at the next follow up should conservative measures fail.     Orders:    Ambulatory Referral to Physical Therapy; Future    MRI lumbar spine without contrast; Future

## 2024-10-16 ENCOUNTER — APPOINTMENT (OUTPATIENT)
Dept: RADIOLOGY | Facility: CLINIC | Age: 69
End: 2024-10-16
Payer: COMMERCIAL

## 2024-10-16 DIAGNOSIS — M25.562 CHRONIC PAIN OF LEFT KNEE: ICD-10-CM

## 2024-10-16 DIAGNOSIS — G89.29 CHRONIC PAIN OF LEFT KNEE: ICD-10-CM

## 2024-10-16 PROCEDURE — 73562 X-RAY EXAM OF KNEE 3: CPT

## 2024-10-18 ENCOUNTER — OFFICE VISIT (OUTPATIENT)
Dept: OBGYN CLINIC | Facility: CLINIC | Age: 69
End: 2024-10-18
Payer: COMMERCIAL

## 2024-10-18 VITALS
SYSTOLIC BLOOD PRESSURE: 132 MMHG | HEART RATE: 73 BPM | WEIGHT: 166 LBS | DIASTOLIC BLOOD PRESSURE: 78 MMHG | BODY MASS INDEX: 26.68 KG/M2 | HEIGHT: 66 IN

## 2024-10-18 DIAGNOSIS — Z91.81 AT RISK FOR FALLING: ICD-10-CM

## 2024-10-18 DIAGNOSIS — M54.16 RIGHT LUMBAR RADICULOPATHY: ICD-10-CM

## 2024-10-18 DIAGNOSIS — M23.8X2 JOINT LAXITY OF LEFT KNEE: ICD-10-CM

## 2024-10-18 DIAGNOSIS — M76.31 ILIOTIBIAL BAND TENDONITIS OF RIGHT SIDE: ICD-10-CM

## 2024-10-18 DIAGNOSIS — M25.562 CHRONIC PAIN OF LEFT KNEE: ICD-10-CM

## 2024-10-18 DIAGNOSIS — M17.12 PRIMARY OSTEOARTHRITIS OF LEFT KNEE: ICD-10-CM

## 2024-10-18 DIAGNOSIS — R29.898 RIGHT LEG WEAKNESS: Primary | ICD-10-CM

## 2024-10-18 DIAGNOSIS — G89.29 CHRONIC PAIN OF LEFT KNEE: ICD-10-CM

## 2024-10-18 PROCEDURE — 99204 OFFICE O/P NEW MOD 45 MIN: CPT | Performed by: STUDENT IN AN ORGANIZED HEALTH CARE EDUCATION/TRAINING PROGRAM

## 2024-10-18 NOTE — PROGRESS NOTES
New Office Note    Assessment:     1. Right leg weakness    2. Right lumbar radiculopathy    3. Primary osteoarthritis of left knee    4. Iliotibial band tendonitis of right side    5. Joint laxity of left knee    6. Chronic pain of left knee    7. At risk for falling        Plan:   Diagnoses and all orders for this visit:    Right leg weakness  -     MRI lumbar spine wo contrast; Future    Right lumbar radiculopathy  -     MRI lumbar spine wo contrast; Future    Primary osteoarthritis of left knee  -     Ambulatory referral to Orthopedic Surgery  -     Brace  -     Injection Procedure Prior Authorization; Future    Iliotibial band tendonitis of right side  -     Ambulatory referral to Orthopedic Surgery    Joint laxity of left knee  -     Brace    Chronic pain of left knee  -     Brace  -     Injection Procedure Prior Authorization; Future    At risk for falling  -     MRI lumbar spine wo contrast; Future         Patient's exam is worrisome for significant nerve compression likely in her lumbar spine as she is unable to flex her right hip.  She does not have pain with hip motion and has very good motion of her hip joint.  She is unable to hold a straight leg raise but does not have pain with passive straight leg raise.  Her previous MRI shows disc disease at the L4-L5 level.  Her exam is more consistent with pathology higher in the lumbar spine.  We will order a stat MRI due to her 0/5 strength in hip flexion.  In regards to her left knee we discussed viscosupplementation as she has had no relief with cortisone in the past.  A referral has been placed for Visco.  Pain score 7/10 in the left knee.  If her lumbar spine MRI shows significant pathology she will need repeat evaluation by neurosurgery.    Subjective:     Patient ID: Corinne J Snyder is a 68 y.o. female.  Chief Complaint:  HPI:  68 y.o. female presents today for initial evaluation of her right hip and left knee due to pain.  She has an appointment next  month for her left knee, known osteoarthritis.  She previous cortisone injection with minimal lasting relief.  At times her left knee does want to buckle underneath her.  Pain scale 8/10.  She was also treating with spine and pain for right sided lumbar radiculopathy with pain through her buttock area and will radiate distally to the dorsal aspect of her right foot with associated numbness and tingling.  On 8/28/2024 she had bilateral L5 TFESI's which improved the pain radiating into her buttocks.  She states a few days following that injection her right leg started to buckle underneath her.  She has difficulty flexing her right hip.  She has pain laterally at her right hip and cannot lay on her right side at night.  She finds herself sleeping on the floor with her feet up on the couch. She does crawl at times at home to get around because of her right leg symptoms.   350mg neurontin BID along with OTC Tylenol and Aleve    She was scheduled for an EMG however due to length of time until the study she went to [place] [facility] and had an EMG 2 weeks ago which showed no abnormality to account for her right leg symptoms.    She denies any right groin pain.    She is slated for PT next week ordered by Neurosurgery and was PRN'd from that department last week and was sent for an updated MRI which she has 11/8.     Allergy:  Allergies   Allergen Reactions    Amoxicillin Rash and Shortness Of Breath    Ciprofloxacin Myalgia    Darvon [Propoxyphene] Dizziness     Medications:  all current active meds have been reviewed  Past Medical History:  Past Medical History:   Diagnosis Date    Arthritis     Brachial plexus injury 1982    with permanent nerve damage     Endometrial hyperplasia, unspecified     resolved 11/26/2014    Pelvic floor dysfunction     Plantar fasciitis     Tachycardia     intermittent had full cardiac workup-benign    Uterine cyst 2014    removed     Past Surgical History:  Past Surgical History:   Procedure  Laterality Date    COLONOSCOPY  07/15/2015    normal    COLONOSCOPY  06/2021    Dr Luna. Hyperplastic polyp removed.    CYSTOSCOPY  12/15/2014    last assesed 12/15/2014, managed by Jigar Arenas     Family History:  Family History   Problem Relation Age of Onset    Colon cancer Mother 82        Metastatic    No Known Problems Father     Hyperlipidemia Brother     Breast cancer Maternal Aunt     Colon cancer Maternal Uncle     Ovarian cancer Cousin     Cancer Cousin         female c ancer    Breast cancer Family     Ovarian cancer Family     Substance Abuse Neg Hx     Mental illness Neg Hx     Alcohol abuse Neg Hx      Social History:  Social History     Substance and Sexual Activity   Alcohol Use No     Social History     Substance and Sexual Activity   Drug Use No     Social History     Tobacco Use   Smoking Status Never   Smokeless Tobacco Never           ROS:  General: Per HPI  Skin: Negative, except if noted below  HEENT: Negative  Respiratory: Negative  Cardiovascular: Negative  Gastrointestinal: Negative  Urinary: Negative  Vascular: Negative  Musculoskeletal: Positive per HPI   Neurologic: Positive per HPI  Endocrine: Negative    Objective:  BP Readings from Last 1 Encounters:   10/18/24 132/78      Wt Readings from Last 1 Encounters:   10/18/24 75.3 kg (166 lb)        Respiratory:   non-labored respirations    Lymphatics:  no palpable lymph nodes    Gait:   Varus thrust gait    Neurologic:   Alert and oriented times 3  Patient with normal sensation except as noted below    Bilateral Lower Extremity:  Left Knee:      Inspection:  skin intact    Overall limb alignment varus    Effusion: none    ROM 0-120 with crepitation and stiffness    Extensor Lag: none    Palpation: medial Joint line tenderness to palpation    AP Stability at 90 deg stable    M/L stability in full extension: medial laxity with valgus stress    M/L stability in midflexion medial laxity with valgus stress    Motor: 5/5 Q/HS/TA/GS/P     "Pulses: 2+ DP / 2+ PT    SILT DP/SP/S/S/TN    Right knee:     Inspection:  skin intact    Overall limb alignment rather neutral    Effusion: none     of flexion without pain    Extensor La degrees    Palpation: no Joint line tenderness to palpation    AP Stability at 90 deg stable    M/L stability in full extension stable    M/L stability in midflexion stable    Motor: 5/5 HS/TA/GS/P 0/5 hip flexor strength    Pulses: 2+ DP / 2+ PT    SILT DP/SP/S/S/TN  (-) SLR B/L   unable to maintain straight leg raise position    Imaging:  My interpretation XR AP left knee/lateral/ashton/sunrise left knee taken 10/16/24: severe medial joint space narrowing with bone-on-bone opposition, subchondral sclerosis, subchondral cysts, osteophyte formation. No fracture or dislocation.   Right hip and pelvis x-rays taken 2024 was reviewed today and shows: Very well-preserved femoral acetabular joint spaces bilaterally without evidence or appreciable degenerative changes.     BMI:   Estimated body mass index is 26.79 kg/m² as calculated from the following:    Height as of this encounter: 5' 6\" (1.676 m).    Weight as of this encounter: 75.3 kg (166 lb).  BSA:   Estimated body surface area is 1.85 meters squared as calculated from the following:    Height as of this encounter: 5' 6\" (1.676 m).    Weight as of this encounter: 75.3 kg (166 lb).           Scribe Attestation      I,:  Shon Azevedo am acting as a scribe while in the presence of the attending physician.:       I,:  Jarrell Banks DO personally performed the services described in this documentation    as scribed in my presence.:               "

## 2024-10-19 ENCOUNTER — HOSPITAL ENCOUNTER (OUTPATIENT)
Dept: MRI IMAGING | Facility: HOSPITAL | Age: 69
Discharge: HOME | End: 2024-10-19
Attending: STUDENT IN AN ORGANIZED HEALTH CARE EDUCATION/TRAINING PROGRAM
Payer: COMMERCIAL

## 2024-10-19 DIAGNOSIS — M54.16 RIGHT LUMBAR RADICULOPATHY: ICD-10-CM

## 2024-10-19 DIAGNOSIS — Z91.81 AT RISK FOR FALLING: ICD-10-CM

## 2024-10-19 DIAGNOSIS — R29.898 RIGHT LEG WEAKNESS: ICD-10-CM

## 2024-10-19 PROCEDURE — 72148 MRI LUMBAR SPINE W/O DYE: CPT

## 2024-10-22 ENCOUNTER — TELEPHONE (OUTPATIENT)
Age: 69
End: 2024-10-22

## 2024-10-22 NOTE — TELEPHONE ENCOUNTER
Caller: Patient     Doctor: Darren     Reason for call: Patient asked if she should go to PT due to her MRI results.     Call back#: [phone]

## 2024-11-01 ENCOUNTER — OFFICE VISIT (OUTPATIENT)
Dept: OBGYN CLINIC | Facility: CLINIC | Age: 69
End: 2024-11-01
Payer: COMMERCIAL

## 2024-11-01 ENCOUNTER — TELEPHONE (OUTPATIENT)
Age: 69
End: 2024-11-01

## 2024-11-01 VITALS
HEART RATE: 80 BPM | SYSTOLIC BLOOD PRESSURE: 110 MMHG | WEIGHT: 166 LBS | HEIGHT: 66 IN | DIASTOLIC BLOOD PRESSURE: 64 MMHG | BODY MASS INDEX: 26.68 KG/M2

## 2024-11-01 DIAGNOSIS — R29.898 RIGHT LEG WEAKNESS: ICD-10-CM

## 2024-11-01 DIAGNOSIS — M54.16 RIGHT LUMBAR RADICULOPATHY: Primary | ICD-10-CM

## 2024-11-01 DIAGNOSIS — M76.31 ILIOTIBIAL BAND TENDONITIS OF RIGHT SIDE: ICD-10-CM

## 2024-11-01 DIAGNOSIS — M17.12 PRIMARY OSTEOARTHRITIS OF LEFT KNEE: ICD-10-CM

## 2024-11-01 PROCEDURE — 99214 OFFICE O/P EST MOD 30 MIN: CPT | Performed by: STUDENT IN AN ORGANIZED HEALTH CARE EDUCATION/TRAINING PROGRAM

## 2024-11-01 NOTE — TELEPHONE ENCOUNTER
Caller: Nemo Mendoza    Doctor: Darren    Reason: calling about injection auth      Transferred to [facility] team

## 2024-11-01 NOTE — PROGRESS NOTES
Knee New Office Note    Assessment:     1. Right leg weakness    2. Right lumbar radiculopathy    3. Iliotibial band tendonitis of right side    4. Primary osteoarthritis of left knee        Plan:     Problem List Items Addressed This Visit          Musculoskeletal and Integument    Primary osteoarthritis of left knee     Other Visit Diagnoses       Right leg weakness    -  Primary    Right lumbar radiculopathy        Iliotibial band tendonitis of right side               Findings today are consistent with right hip flexor weakness and left knee osteoarthritis. Imaging and prognosis was reviewed with the patient today. MRI shows L4-5 disc herniation but we reviewed that this does not correlate with hip flexion. She does not have pain with hip motion and has very good motion of her hip joint. There is no issue with intra-articular aspect within the hip that is causing any symptoms. She was encouraged to proceed with neurosurgery for further discussion of treatment options. For her left knee she was encouraged to continue use of the knee brace. Visco was ordered at previous visit. Follow up once visco is approved.    Subjective:     Patient ID: Corinne J Snyder is a 68 y.o. female.  Chief Complaint:  HPI:  68 y.o. female presents to the office for follow-up of her right hip and left knee. Since last visit she received an MRI of her lumbar spine due to her inability to raise the right hip. On 8/28/2024 she had bilateral L5 TFESI's which improved the pain radiating into her buttocks.  She states a few days following that injection her right leg started to buckle underneath her.  She has difficulty flexing her right hip.  She has pain laterally at her right hip and cannot lay on her right side at night.  In regard to her left knee she has been wearing a knee brace since last visit which has been beneficial for her.  She is interested in proceeding with a Visco injection to the left knee.  Previous cortisone injections  were not beneficial for her.    Allergy:  Allergies   Allergen Reactions    Amoxicillin Rash and Shortness Of Breath    Ciprofloxacin Myalgia    Darvon [Propoxyphene] Dizziness     Medications:  all current active meds have been reviewed  Past Medical History:  Past Medical History:   Diagnosis Date    Arthritis     Brachial plexus injury 1982    with permanent nerve damage     Endometrial hyperplasia, unspecified     resolved 11/26/2014    Pelvic floor dysfunction     Plantar fasciitis     Tachycardia     intermittent had full cardiac workup-benign    Uterine cyst 2014    removed     Past Surgical History:  Past Surgical History:   Procedure Laterality Date    COLONOSCOPY  07/15/2015    normal    COLONOSCOPY  06/2021    Dr Luna. Hyperplastic polyp removed.    CYSTOSCOPY  12/15/2014    last assesed 12/15/2014, managed by Jigar Arenas     Family History:  Family History   Problem Relation Age of Onset    Colon cancer Mother 82        Metastatic    No Known Problems Father     Hyperlipidemia Brother     Breast cancer Maternal Aunt     Colon cancer Maternal Uncle     Ovarian cancer Cousin     Cancer Cousin         female c ancer    Breast cancer Family     Ovarian cancer Family     Substance Abuse Neg Hx     Mental illness Neg Hx     Alcohol abuse Neg Hx      Social History:  Social History     Substance and Sexual Activity   Alcohol Use No     Social History     Substance and Sexual Activity   Drug Use No     Social History     Tobacco Use   Smoking Status Never   Smokeless Tobacco Never         ROS:  General: Per HPI  Skin: Negative, except if noted below  HEENT: Negative  Respiratory: Negative  Cardiovascular: Negative  Gastrointestinal: Negative  Urinary: Negative  Vascular: Negative  Musculoskeletal: Positive per HPI   Neurologic: Positive per HPI  Endocrine: Negative    Objective:  BP Readings from Last 1 Encounters:   11/01/24 110/64      Wt Readings from Last 1 Encounters:   11/01/24 75.3 kg (166 lb)     "    Respiratory:   non-labored respirations    Lymphatics:  no palpable lymph nodes    Gait:   Varus thrust gait    Neurologic:   Alert and oriented times 3  Patient with normal sensation except as noted below  Deep tendon reflexes 2+ except as noted in MSK exam    Bilateral Lower Extremity:  Left Knee:      Inspection:  skin intact    Overall limb alignment varus    Effusion: none    ROM 0-120 with crepitation and stiffness    Extensor Lag: none    Palpation: medial Joint line tenderness to palpation    AP Stability at 90 deg stable    M/L stability in full extension: medial laxity with valgus stress    M/L stability in midflexion medial laxity with valgus stress    Motor: 5/5 Q/HS/TA/GS/P    Pulses: 2+ DP / 2+ PT    SILT DP/SP/S/S/TN     Right knee:     Inspection:  skin intact    Overall limb alignment rather neutral    Effusion: none     of flexion without pain    Extensor La degrees    Palpation: no Joint line tenderness to palpation    AP Stability at 90 deg stable    M/L stability in full extension stable    M/L stability in midflexion stable    Motor: 5/5 HS/TA/GS/P 0/5 hip flexor strength    Pulses: 2+ DP / 2+ PT    SILT DP/SP/S/S/TN  (-) SLR B/L   unable to maintain straight leg raise position    Imaging:  My interpretation of MRI lumbar spine: large right-sided L4-5 disc herniation present    BMI:   Estimated body mass index is 26.79 kg/m² as calculated from the following:    Height as of this encounter: 5' 6\" (1.676 m).    Weight as of this encounter: 75.3 kg (166 lb).  BSA:   Estimated body surface area is 1.85 meters squared as calculated from the following:    Height as of this encounter: 5' 6\" (1.676 m).    Weight as of this encounter: 75.3 kg (166 lb).           Scribe Attestation      I,:  Callie Hernandez am acting as a scribe while in the presence of the attending physician.:       I,:  Jarrell Banks DO personally performed the services described in this documentation    as " scribed in my presence.:

## 2024-11-07 ENCOUNTER — OFFICE VISIT (OUTPATIENT)
Dept: NEUROSURGERY | Facility: CLINIC | Age: 69
End: 2024-11-07
Payer: COMMERCIAL

## 2024-11-07 ENCOUNTER — HOSPITAL ENCOUNTER (OUTPATIENT)
Dept: RADIOLOGY | Facility: HOSPITAL | Age: 69
Discharge: HOME | End: 2024-11-07
Payer: COMMERCIAL

## 2024-11-07 VITALS
HEIGHT: 66 IN | SYSTOLIC BLOOD PRESSURE: 152 MMHG | WEIGHT: 166 LBS | BODY MASS INDEX: 26.68 KG/M2 | DIASTOLIC BLOOD PRESSURE: 81 MMHG | TEMPERATURE: 98 F | OXYGEN SATURATION: 97 %

## 2024-11-07 DIAGNOSIS — M54.16 LUMBAR RADICULOPATHY: Primary | ICD-10-CM

## 2024-11-07 DIAGNOSIS — M54.16 LUMBAR RADICULOPATHY: ICD-10-CM

## 2024-11-07 PROCEDURE — 99214 OFFICE O/P EST MOD 30 MIN: CPT | Performed by: NEUROLOGICAL SURGERY

## 2024-11-07 PROCEDURE — 72114 X-RAY EXAM L-S SPINE BENDING: CPT

## 2024-11-07 RX ORDER — METHOCARBAMOL 750 MG/1
750 TABLET, FILM COATED ORAL EVERY 6 HOURS PRN
Qty: 30 TABLET | Refills: 3 | Status: SHIPPED | OUTPATIENT
Start: 2024-11-07

## 2024-11-07 RX ORDER — METHYLPREDNISOLONE 4 MG/1
TABLET ORAL
Qty: 1 EACH | Refills: 3 | Status: SHIPPED | OUTPATIENT
Start: 2024-11-07

## 2024-11-07 RX ORDER — GABAPENTIN 300 MG/1
300 CAPSULE ORAL 3 TIMES DAILY
Qty: 90 CAPSULE | Refills: 3 | Status: SHIPPED | OUTPATIENT
Start: 2024-11-07

## 2024-11-07 RX ORDER — GABAPENTIN 300 MG/1
300 CAPSULE ORAL 2 TIMES DAILY
COMMUNITY
End: 2024-11-07

## 2024-11-07 NOTE — PROGRESS NOTES
Ambulatory Visit  Name: Corinne J Snyder      : 1955      MRN: [id number]  Encounter Provider: Kulwant Spaulding MD  Encounter Date: 2024   Encounter department: [facility] [place]    Assessment & Plan      History of Present Illness     Patient was previously evaluated on 2024 and 10/8/24. She is joined by her daughter.    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     No persistent numbness, saddle anesthesia, bowel/bladder incontinence, recent trauma, prior spinal surgery.    Followed by Pain Medicine at [facility]'s s/p bilateral L4/5 TFESI on 24 with improved posterior gluteal pain but persistent right L4 radiculopathy.    She reports that her right leg pain and proximal weakness progressively worsened since the MATTHEW. She did not have any radiating symptoms in legs prior to injection. Still has no left leg symptoms, but requiring cane and left knee brace because she is compensating.    Remains on Neurontin 300 mg BID, tolerating well, with Tylenol/Aleve.    No recent PT.    Not on AC/AP. Non-smoker.    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 Minimal disc herniations without significant central stenosis or cord compression.    MRI thoracic repeated on 10/2/2024 demonstrated no acute changes.    MRI lumbar spine on 23 showed mild chronic degenerative changes most prominent at L4/5.    MRI lumbar spine repeated on 10/19/24 showed progression of degenerative changes at L3-5 a/w worsening bilateral facet hypertrophy and lateral recess stenoses.    EMG was  "previously performed in October but may have been \"too soon\".    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 Both conditions are initially treated conservatively with appropriate medications and PT, majority with improvement and good functional outcome without requiring surgery.     I will increase Neurontin to 300 mg TID, add Robaxin and Medrol PRN.     Resume spine PT.    Obtain XR flexion/extension lumbar spine to assess for dynamic instability.    I previously explained that the mild interval increase in thoracic syringomyelia over 10 years is likely a non-specific, benign process, especially without aggressive radiographic features involving the spinal cord and without clinical evidence of myelopathy.     Return to clinic in 3 months to reassess symptoms after maximal conservative management.    All questions and concerns were addressed during this visit.    HPI  Review of Systems   Constitutional: Negative.    HENT: Negative.     Eyes: Negative.    Respiratory: Negative.     Cardiovascular: Negative.    Gastrointestinal: Negative.    Endocrine: Negative.    Genitourinary: Negative.    Musculoskeletal:  Positive for back pain and gait problem.        Lumbar Radiculopathy- L/S Mri on 10/19/24    Lbp radiates to right buttock and lateral leg stopping at the calf.   + W and heaviness on RLE   Denies any LLE symptoms  Unsteady when walking   Denies any B/B incontinence   Bilateral L5 TFESI on 8/28/24- symptoms started    after this injection  PT -none   Ice/Heat-- takes edge off  Meds: Gabapentin, Aleve   No AC/ non-smoker/ No previous back sx       Skin: Negative.    Allergic/Immunologic: Negative.    Neurological:  Positive for weakness.   Hematological: Negative.    Psychiatric/Behavioral: Negative.     All other systems reviewed and are negative.    I have " "personally reviewed the MA's review of systems and made changes as necessary.    /81   Temp 98 °F (36.7 °C) (Temporal)   Ht 5' 6\" (1.676 m)   Wt 75.3 kg (166 lb)   LMP  (LMP Unknown)   SpO2 97%   BMI 26.79 kg/m²     Physical Exam  Constitutional:       Appearance: Normal appearance.   HENT:      Head: Normocephalic and atraumatic.   Eyes:      Extraocular Movements: Extraocular movements intact.      Pupils: Pupils are equal, round, and reactive to light.   Cardiovascular:      Rate and Rhythm: Normal rate and regular rhythm.      Pulses: Normal pulses.      Heart sounds: Normal heart sounds.   Pulmonary:      Effort: Pulmonary effort is normal.      Breath sounds: Normal breath sounds.   Abdominal:      General: Abdomen is flat.      Palpations: Abdomen is soft.   Musculoskeletal:         General: Normal range of motion.      Cervical back: Normal range of motion.   Skin:     General: Skin is warm.   Neurological:      General: No focal deficit present.      Mental Status: She is alert and oriented to person, place, and time. Mental status is at baseline.      Cranial Nerves: Cranial nerves 2-12 are intact.      Gait: Gait is intact.   Psychiatric:         Mood and Affect: Mood normal.         Speech: Speech normal.         Behavior: Behavior normal.       Neurologic Exam     Mental Status   Oriented to person, place, and time.   Attention: normal. Concentration: normal.   Speech: speech is normal   Level of consciousness: alert    Cranial Nerves   Cranial nerves II through XII intact.     CN III, IV, VI   Pupils are equal, round, and reactive to light.    Motor Exam     Strength   Strength 5/5 except as noted. Right hip flexion 2-3/5, no sensory deficits     Sensory Exam   Light touch normal.     Gait, Coordination, and Reflexes     Gait  Gait: normal    Reflexes   Reflexes 2+ except as noted.       I have reviewed the following images/report studies in PACS: No results found.     Administrative " Statements   I have spent a total time of 30 minutes in caring for this patient on the day of the visit/encounter including Diagnostic results, Prognosis, Risks and benefits of tx options, Instructions for management, Patient and family education, Importance of tx compliance, Risk factor reductions, Impressions, Counseling / Coordination of care, Documenting in the medical record, Reviewing / ordering tests, medicine, procedures  , Obtaining or reviewing history  , and Communicating with other healthcare professionals .

## 2024-11-08 NOTE — TELEPHONE ENCOUNTER
Caller: Corinne    Doctor: Darren    Reason for call: Patient is scheduled for Visco 11/25 @ [facility] no Appt at [place] open, Patient rec'ed a call on about an appt for 11/15 but no openings please advise     Call back#: [phone]

## 2024-11-11 ENCOUNTER — PROCEDURE VISIT (OUTPATIENT)
Age: 69
End: 2024-11-11
Payer: COMMERCIAL

## 2024-11-11 VITALS
WEIGHT: 166 LBS | SYSTOLIC BLOOD PRESSURE: 118 MMHG | HEIGHT: 66 IN | BODY MASS INDEX: 26.68 KG/M2 | DIASTOLIC BLOOD PRESSURE: 74 MMHG

## 2024-11-11 DIAGNOSIS — M17.12 PRIMARY OSTEOARTHRITIS OF LEFT KNEE: Primary | ICD-10-CM

## 2024-11-11 PROCEDURE — 20610 DRAIN/INJ JOINT/BURSA W/O US: CPT | Performed by: STUDENT IN AN ORGANIZED HEALTH CARE EDUCATION/TRAINING PROGRAM

## 2024-11-11 NOTE — PROGRESS NOTES
Khushboo Left knee  Pain 6/10      Large joint arthrocentesis: L knee  Universal Protocol:  Consent: Verbal consent obtained.  Risks and benefits: risks, benefits and alternatives were discussed  Consent given by: patient  Patient understanding: patient states understanding of the procedure being performed  Supporting Documentation  Indications: pain   Procedure Details  Location: knee - L knee  Preparation: Patient was prepped and draped in the usual sterile fashion  Needle size: 20 G  Approach: anterolateral  Medications administered: 3 mL sodium hyaluronate 60 MG/3ML    Patient tolerance: patient tolerated the procedure well with no immediate complications  Dressing:  Sterile dressing applied            Scribe Attestation      I,:  Mira Aragon PA-C am acting as a scribe while in the presence of the attending physician.:       I,:  Jarrell Banks DO personally performed the services described in this documentation    as scribed in my presence.:

## 2024-11-19 ENCOUNTER — HOSPITAL ENCOUNTER (OUTPATIENT)
Dept: MRI IMAGING | Facility: HOSPITAL | Age: 69
Discharge: HOME | End: 2024-11-19
Payer: COMMERCIAL

## 2024-11-19 DIAGNOSIS — M62.81 MUSCLE WEAKNESS (GENERALIZED): ICD-10-CM

## 2024-11-19 PROCEDURE — 72141 MRI NECK SPINE W/O DYE: CPT

## 2024-11-19 PROCEDURE — 70551 MRI BRAIN STEM W/O DYE: CPT

## 2024-11-25 ENCOUNTER — TELEPHONE (OUTPATIENT)
Age: 69
End: 2024-11-25

## 2024-11-25 NOTE — TELEPHONE ENCOUNTER
Patient requesting pneumonia and covid vaccinations.     Please place order and call to schedule if ok! Thank you!

## 2024-11-25 NOTE — TELEPHONE ENCOUNTER
Please call patient, she was last seen in September 2023 and is due for a visit: recheck and annual physical ( ok to use new pt appt this Friday or next week). She will receive her immunizations at the visit.

## 2025-02-11 ENCOUNTER — OFFICE VISIT (OUTPATIENT)
Dept: NEUROSURGERY | Facility: CLINIC | Age: 70
End: 2025-02-11
Payer: COMMERCIAL

## 2025-02-11 VITALS
HEIGHT: 66 IN | TEMPERATURE: 97.3 F | HEART RATE: 83 BPM | OXYGEN SATURATION: 99 % | DIASTOLIC BLOOD PRESSURE: 70 MMHG | BODY MASS INDEX: 26.79 KG/M2 | SYSTOLIC BLOOD PRESSURE: 122 MMHG

## 2025-02-11 DIAGNOSIS — G95.0 SYRINX OF SPINAL CORD (HCC): ICD-10-CM

## 2025-02-11 DIAGNOSIS — M54.16 LUMBAR RADICULOPATHY: Primary | ICD-10-CM

## 2025-02-11 PROCEDURE — 99213 OFFICE O/P EST LOW 20 MIN: CPT | Performed by: NEUROLOGICAL SURGERY

## 2025-02-11 RX ORDER — METHOCARBAMOL 750 MG/1
750 TABLET, FILM COATED ORAL EVERY 6 HOURS PRN
Qty: 30 TABLET | Refills: 3 | Status: SHIPPED | OUTPATIENT
Start: 2025-02-11

## 2025-02-11 RX ORDER — GABAPENTIN 300 MG/1
300 CAPSULE ORAL 3 TIMES DAILY
Qty: 90 CAPSULE | Refills: 3 | Status: SHIPPED | OUTPATIENT
Start: 2025-02-11

## 2025-02-11 NOTE — PROGRESS NOTES
"Name: Corinne J Snyder      : 1955      MRN: [id number]  Encounter Provider: Kulwant Spaulding MD  Encounter Date: 2025   Encounter department: [facility] [place]  :  Assessment & Plan        History of Present Illness     Patient was previously evaluated on 2024, 10/8/24, 24. She returns with her  Thom.     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      No persistent numbness, saddle anesthesia, bowel/bladder incontinence, recent trauma, prior spinal surgery.     Followed by Pain Medicine at [facility]'s s/p bilateral L4/5 TFESI on 24 with improved posterior gluteal pain but persistent right L4 radiculopathy. No recent interventions.     Stopped taking Neurontin (\"I heard it makes you gain weight\"), though it was effective for right leg symptoms previously. Robaxin has been helpful.     Has been doing aqua therapy for several months.      Not on AC/AP. Non-smoker.     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 Minimal disc herniations without significant central stenosis or cord compression.     MRI thoracic repeated on 10/2/2024 demonstrated no acute changes.     MRI lumbar spine on 23 showed mild chronic degenerative changes most prominent at L4/5.     MRI lumbar spine repeated on 10/19/24 showed progression of degenerative changes at L3-5 a/w worsening bilateral facet hypertrophy and lateral recess stenoses.     EMG was previously performed in October but may have been \"too soon\".    XR flexion/extension lumbar spine on 24 did not " "demonstrate significant motion or dynamic instability.     MRI brain on 11/19/24 unremarkable.    MRI cervical spine on 11/19/24 showed mild degenerative changes without significant central stenosis, cord compression, or abnormal cord signals.      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 Both conditions are initially treated conservatively with appropriate medications and PT, majority with improvement and good functional outcome without requiring surgery.      I previously explained that the mild interval increase in thoracic syringomyelia over 10 years is likely a non-specific, benign process, especially without aggressive radiographic features involving the spinal cord and without clinical evidence of myelopathy.      I briefly mentioned possible neurosurgical intervention, likely L3-5 decompression. However, I recommend maximizing conservative therapies including resuming Neurontin with Robaxin and continuing PT/aqua therapy. She reports \"I'm not ready for surgery right now\".     RTC 4-6 weeks to reassess symptoms and discuss possible timing of surgery.     All questions and concerns were addressed during this visit.    HPI  Review of Systems   Constitutional: Negative.    HENT: Negative.     Eyes: Negative.    Respiratory: Negative.     Cardiovascular: Negative.    Gastrointestinal: Negative.    Endocrine: Negative.    Genitourinary: Negative.  Negative for decreased urine volume and frequency.   Musculoskeletal:  Positive for back pain, gait problem, myalgias and neck pain.        3MO FU   Lumbar radiculopathy    XR Lspine on 11/7/24    3/10 LBP radiates into Right buttocks to lateral calf  Gait is unsteady when ambulating- improving   W/heaviness on RLE-  climbing up steps due to RLE W and not being able to lift up leg   Meds- Neurontin to 300 mg TID, add " "Robaxin     PT- 2/10/25  No AC/Non smoker/NO previous back sx      Skin: Negative.    Allergic/Immunologic: Negative.    Neurological:  Positive for dizziness (change in position), weakness (RLE) and headaches.   Hematological: Negative.    Psychiatric/Behavioral:  Positive for sleep disturbance.    All other systems reviewed and are negative.   I have personally reviewed the MA's review of systems and made changes as necessary.     Objective   /70 (Patient Position: Sitting, Cuff Size: Standard)   Pulse 83   Temp (!) 97.3 °F (36.3 °C) (Temporal)   Ht 5' 6\" (1.676 m)   LMP  (LMP Unknown)   SpO2 99%   BMI 26.79 kg/m²     Physical Exam  Constitutional:       Appearance: Normal appearance.   HENT:      Head: Normocephalic and atraumatic.   Eyes:      General: Lids are normal.      Extraocular Movements: Extraocular movements intact.      Pupils: Pupils are equal, round, and reactive to light.   Cardiovascular:      Rate and Rhythm: Normal rate and regular rhythm.      Pulses: Normal pulses.      Heart sounds: Normal heart sounds.   Pulmonary:      Effort: Pulmonary effort is normal.      Breath sounds: Normal breath sounds.   Abdominal:      General: Abdomen is flat.      Palpations: Abdomen is soft.   Musculoskeletal:         General: Normal range of motion.      Cervical back: Normal range of motion.   Skin:     General: Skin is warm.   Neurological:      General: No focal deficit present.      Mental Status: She is alert. Mental status is at baseline.      Motor: Motor strength is normal.     Coordination: Coordination is intact.      Deep Tendon Reflexes: Reflexes are normal and symmetric.   Psychiatric:         Mood and Affect: Mood normal.         Speech: Speech normal.         Behavior: Behavior normal.       Neurological Exam  Mental Status  Alert. Oriented to person, place, time and situation. Speech is normal. Language is fluent with no aphasia. Attention and concentration are normal.    Cranial " Nerves  CN II: Visual acuity is normal. Visual fields full to confrontation.  CN III, IV, VI: Extraocular movements intact bilaterally. Normal lids and orbits bilaterally. Pupils equal round and reactive to light bilaterally.  CN V: Facial sensation is normal.  CN VII: Full and symmetric facial movement.  CN VIII: Hearing is normal.  CN IX, X: Palate elevates symmetrically. Normal gag reflex.  CN XI: Shoulder shrug strength is normal.  CN XII: Tongue midline without atrophy or fasciculations.    Motor  Normal muscle bulk throughout. Normal muscle tone. Strength is 5/5 throughout all four extremities.    Sensory  Sensation is intact to light touch, pinprick, vibration and proprioception in all four extremities.    Reflexes  Deep tendon reflexes are 2+ and symmetric in all four extremities.    Coordination    Finger-to-nose, rapid alternating movements and heel-to-shin normal bilaterally without dysmetria.    Gait  Normal casual, toe, heel and tandem gait.      Radiology Results Review: I have reviewed the following images/report studies in PACS: No results found.     Administrative Statements   I have spent a total time of 30 minutes in caring for this patient on the day of the visit/encounter including Diagnostic results, Prognosis, Risks and benefits of tx options, Instructions for management, Patient and family education, Importance of tx compliance, Risk factor reductions, Impressions, Counseling / Coordination of care, Documenting in the medical record, Reviewing / ordering tests, medicine, procedures  , Obtaining or reviewing history  , and Communicating with other healthcare professionals . Topics discussed with the patient / family include symptom assessment and management, medication review, medication adjustment, psychosocial support, advanced directives, goals of care, supportive listening, and anticipatory guidance.

## 2025-03-03 ENCOUNTER — HOSPITAL ENCOUNTER (OUTPATIENT)
Dept: MRI IMAGING | Facility: HOSPITAL | Age: 70
Discharge: HOME | End: 2025-03-03
Payer: COMMERCIAL

## 2025-03-03 DIAGNOSIS — R29.898 OTHER SYMPTOMS AND SIGNS INVOLVING THE MUSCULOSKELETAL SYSTEM: ICD-10-CM

## 2025-03-03 DIAGNOSIS — M54.16 RADICULOPATHY, LUMBAR REGION: ICD-10-CM

## 2025-03-03 DIAGNOSIS — M48.061 SPINAL STENOSIS, LUMBAR REGION WITHOUT NEUROGENIC CLAUDICATION: ICD-10-CM

## 2025-03-03 DIAGNOSIS — G54.1 LUMBOSACRAL PLEXUS DISORDERS: ICD-10-CM

## 2025-03-03 PROCEDURE — A9585 GADOBUTROL INJECTION: HCPCS | Performed by: RADIOLOGY

## 2025-03-03 PROCEDURE — 72158 MRI LUMBAR SPINE W/O & W/DYE: CPT

## 2025-03-03 PROCEDURE — 72197 MRI PELVIS W/O & W/DYE: CPT

## 2025-03-03 RX ORDER — GADOBUTROL 604.72 MG/ML
7 INJECTION INTRAVENOUS
Status: COMPLETED | OUTPATIENT
Start: 2025-03-03 | End: 2025-03-03

## 2025-03-03 RX ADMIN — GADOBUTROL 7 ML: 604.72 INJECTION INTRAVENOUS at 19:01

## 2025-04-09 ENCOUNTER — TELEPHONE (OUTPATIENT)
Age: 70
End: 2025-04-09

## 2025-04-09 NOTE — TELEPHONE ENCOUNTER
4/9/25 CALLED PT TO OFFER TO R/S CANCELLED F/U WITH DR SPAULDING, PT DID NOT WANT TO GIVE HIPAA IDENTIFIERS TO CONFIRM. PT DISCONNECTED CALL. PLEASE REACH OUT TO DISCUSS R/S. TY.     Appointment canceled for Corinne J Snyder ([id number])   Visit type: OFFICE VISIT SHORT PG   4/11/2025 8:30 AM (15 minutes) with [facility]aulding [facility]      Reason for cancellation: Patient

## 2025-04-14 ENCOUNTER — TELEPHONE (OUTPATIENT)
Dept: FAMILY MEDICINE CLINIC | Facility: CLINIC | Age: 70
End: 2025-04-14

## 2025-05-13 ENCOUNTER — HOSPITAL ENCOUNTER (OUTPATIENT)
Dept: CT IMAGING | Facility: HOSPITAL | Age: 70
Discharge: HOME | End: 2025-05-13
Attending: NEUROLOGICAL SURGERY
Payer: COMMERCIAL

## 2025-05-13 DIAGNOSIS — M47.816 SPONDYLOSIS WITHOUT MYELOPATHY OR RADICULOPATHY, LUMBAR REGION: ICD-10-CM

## 2025-05-13 PROCEDURE — 72131 CT LUMBAR SPINE W/O DYE: CPT

## 2025-05-21 ENCOUNTER — OFFICE VISIT (OUTPATIENT)
Dept: CARDIOLOGY CLINIC | Facility: CLINIC | Age: 70
End: 2025-05-21
Payer: COMMERCIAL

## 2025-05-21 VITALS
HEIGHT: 66 IN | WEIGHT: 171 LBS | DIASTOLIC BLOOD PRESSURE: 82 MMHG | BODY MASS INDEX: 27.48 KG/M2 | SYSTOLIC BLOOD PRESSURE: 130 MMHG | HEART RATE: 63 BPM

## 2025-05-21 DIAGNOSIS — Z01.810 PREOPERATIVE CARDIOVASCULAR EXAMINATION: Primary | ICD-10-CM

## 2025-05-21 DIAGNOSIS — E78.00 HYPERCHOLESTEROLEMIA: ICD-10-CM

## 2025-05-21 PROCEDURE — 93000 ELECTROCARDIOGRAM COMPLETE: CPT | Performed by: INTERNAL MEDICINE

## 2025-05-21 PROCEDURE — 99204 OFFICE O/P NEW MOD 45 MIN: CPT | Performed by: INTERNAL MEDICINE

## 2025-05-21 RX ORDER — ERYTHROMYCIN STEARATE 250 MG
250 TABLET ORAL 3 TIMES DAILY
COMMUNITY

## 2025-05-21 RX ORDER — CHLORHEXIDINE GLUCONATE ORAL RINSE 1.2 MG/ML
SOLUTION DENTAL
COMMUNITY
Start: 2025-03-27

## 2025-05-21 RX ORDER — DOXYCYCLINE 100 MG/1
1 CAPSULE ORAL 2 TIMES DAILY
COMMUNITY
Start: 2025-02-21

## 2025-05-21 RX ORDER — PREDNISONE 10 MG/1
10 TABLET ORAL DAILY
COMMUNITY
Start: 2025-02-19

## 2025-05-21 RX ORDER — LEVOCETIRIZINE DIHYDROCHLORIDE 5 MG/1
5 TABLET, FILM COATED ORAL
COMMUNITY

## 2025-05-21 RX ORDER — MOMETASONE FUROATE 1 MG/G
1 OINTMENT TOPICAL DAILY
COMMUNITY
Start: 2025-04-03

## 2025-05-21 NOTE — PROGRESS NOTES
Cardiology Consultation     Corinne J Snyder  [id number]  1955  [facility] [place] [address] [address]  [address] [id number] [phone]    1. Preoperative cardiovascular examination  POCT ECG    Lipid Panel With Direct LDL      2. Hypercholesterolemia            Discussion/Summary:    Preoperative cardiovascular risk assessment - Corinne is low risk for a lumbar laminectomy.  She can proceed without further cardiovascular testing.  Her ECG is normal today, she has no cardiac history and conveys no cardiac symptoms.    Hypercholesterolemia - Her LDL was at 150 about 2 years ago.  We did order updated blood work today.  She does not meet any strict criteria for pharmacologic therapy.  She will be back to see us in 1 year to continue to follow her risk factors.    HPI:    Mrs. Ramsay comes in for a consultation as a preoperative cardiovascular risk assessment for upcoming lumbar spine surgery, being performed by Dr. Ramirez out of [place].  She will be having a lumbar laminectomy.  Corinne has no cardiac history.  Her only risk factor is hypercholesterolemia and she has not been on any treatment for this.  She denies any cardiovascular symptoms.  No chest pain or any symptoms of angina.  No shortness of breath or any signs/symptoms of CHF.  She denies palpitations, lightheadedness or history of syncope.  Since developing her worsening lumbar disc disease and spinal stenosis, she was quite active.  Over the last 6 months she has not been able to perform anything more than her activities of daily living.  However she still remains adequately active.      Problem List[1]  Past Medical History:   Diagnosis Date    Arthritis     Brachial plexus injury 1982    with permanent nerve damage     Endometrial hyperplasia, unspecified     resolved 11/26/2014    Pelvic floor dysfunction     Plantar fasciitis     Tachycardia      intermittent had full cardiac workup-benign    Uterine cyst 2014    removed     Social History     Socioeconomic History    Marital status: /Civil Union     Spouse name: Not on file    Number of children: Not on file    Years of education: Not on file    Highest education level: Not on file   Occupational History    Occupation: Retired - Homemaker   Tobacco Use    Smoking status: Never    Smokeless tobacco: Never   Vaping Use    Vaping status: Never Used   Substance and Sexual Activity    Alcohol use: No    Drug use: No    Sexual activity: Yes     Partners: Male   Other Topics Concern    Not on file   Social History Narrative    Always uses seat belt    Caffeine use - coffee, green tea amount unspecified    Has smoke detectors     Social Drivers of Health     Financial Resource Strain: Not on file   Food Insecurity: Not on file   Transportation Needs: Not on file   Physical Activity: Not on file   Stress: Not on file   Social Connections: Not on file   Intimate Partner Violence: Not on file   Housing Stability: Not on file      Family History   Problem Relation Age of Onset    Colon cancer Mother 82        Metastatic    No Known Problems Father     Hyperlipidemia Brother     Breast cancer Maternal Aunt     Colon cancer Maternal Uncle     Ovarian cancer Cousin     Cancer Cousin         female c ancer    Breast cancer Family     Ovarian cancer Family     Substance Abuse Neg Hx     Mental illness Neg Hx     Alcohol abuse Neg Hx      Past Surgical History:   Procedure Laterality Date    COLONOSCOPY  07/15/2015    normal    COLONOSCOPY  06/2021    Dr Luna. Hyperplastic polyp removed.    CYSTOSCOPY  12/15/2014    last assesed 12/15/2014, managed by Jigar Arenas     Current Medications[2]  Allergies   Allergen Reactions    Amoxicillin Rash and Shortness Of Breath    Ciprofloxacin Myalgia    Darvon [Propoxyphene] Dizziness     Vitals:    05/21/25 0952   BP: 130/82   BP Location: Left arm   Patient Position:  "Sitting   Cuff Size: Standard   Pulse: 63   Weight: 77.6 kg (171 lb)   Height: 5' 6\" (1.676 m)       Labs:  Lab Results   Component Value Date    K 3.8 09/18/2023    K 4.3 02/15/2019     09/18/2023     02/15/2019    CO2 31 09/18/2023    CO2 28 02/15/2019    BUN 12 09/18/2023    BUN 13 02/15/2019    CREATININE 0.77 09/18/2023    CREATININE 0.74 02/15/2019    CALCIUM 9.6 09/18/2023    CALCIUM 9.0 02/15/2019     Lab Results   Component Value Date    WBC 5.03 09/18/2023    WBC >30 (A) 05/02/2016    HGB 14.4 09/18/2023    HGB 13.7 12/29/2014    HCT 44.1 09/18/2023    HCT 41.8 12/29/2014    MCV 91 09/18/2023    MCV 90 12/29/2014     09/18/2023     12/29/2014     Lab Results   Component Value Date    TRIG 176 (H) 09/18/2023    HDL 52 09/18/2023     Imaging: ECG obtained today demonstrates normal sinus rhythm and is within normal limits.    CT spine lumbar wo contrast  Result Date: 5/14/2025  Narrative: CT LUMBAR SPINE WITHOUT CONTRAST INDICATION:   M47.816: Spondylosis without myelopathy or radiculopathy, lumbar region. COMPARISON: 3/3/2025 TECHNIQUE:  Contiguous axial images through the lumbar spine were obtained. Sagittal and coronal reconstructions were performed. IV Contrast: None. Radiation dose length product (DLP) for this visit:  1038.93 mGy-cm. .  This examination, like all CT scans performed in the [facility], was performed utilizing techniques to minimize radiation dose exposure, including the use of iterative reconstruction and automated exposure control. IMAGE QUALITY:  Diagnostic. FINDINGS: ALIGNMENT:  There are 5 lumbar type vertebral bodies. There is retrolisthesis of L1-L2, L2-L3 and L3-L4. VERTEBRAE: Bones are osteopenic. Vertebral body heights are maintained. No acute fractures. Schmorl's node along the superior endplate of T11. DEGENERATIVE CHANGES: Lower Thoracic spine: Normal lower thoracic disc spaces. Lumbar Spine: L1-2: There is disc space narrowing. There " "is a mild bulge. There is facet arthrosis. There is mild canal stenosis. There is no significant foraminal narrowing. L2-3: There is a bulging annulus. There is disc space narrowing. There is mild canal stenosis. There is no significant foraminal narrowing. L3-4: There is disc space narrowing. There is vacuum disc phenomenon. There is a bulging annulus. There is facet arthrosis. There is mild to moderate canal stenosis. There is mild bilateral foraminal narrowing. L4-5: There is a bulging annulus. There is ligamentum flavum thickening. There is severe canal stenosis. There is marginal osteophytosis. There is moderate to severe bilateral foraminal narrowing. L5-S1: There is facet arthrosis. There is no significant canal stenosis or foraminal narrowing. PARASPINAL SOFT TISSUES:  Normal. OTHER: None.     Impression: Degenerative changes of the lumbar spine, as described above. Multifactorial disease results in severe canal stenosis at L4-L5. Moderate to severe bilateral foraminal narrowing is present at this level. Workstation performed: [id number]       Review of Systems:  Review of Systems   Constitutional: Negative.    HENT: Negative.     Eyes: Negative.    Respiratory: Negative.     Cardiovascular: Negative.    Gastrointestinal: Negative.    Musculoskeletal:  Positive for arthralgias and back pain.   Skin: Negative.    Allergic/Immunologic: Negative.    Neurological:  Positive for weakness.   Hematological: Negative.    Psychiatric/Behavioral: Negative.     All other systems reviewed and are negative.    Vitals:    05/21/25 0952   BP: 130/82   BP Location: Left arm   Patient Position: Sitting   Cuff Size: Standard   Pulse: 63   Weight: 77.6 kg (171 lb)   Height: 5' 6\" (1.676 m)       Physical Exam  Vitals and nursing note reviewed.   Constitutional:       Appearance: She is well-developed.   HENT:      Head: Normocephalic and atraumatic.     Eyes:      General: No scleral icterus.        Right eye: No discharge.   "       Left eye: No discharge.      Pupils: Pupils are equal, round, and reactive to light.     Neck:      Thyroid: No thyromegaly.      Vascular: No JVD.     Cardiovascular:      Rate and Rhythm: Normal rate and regular rhythm. No extrasystoles are present.     Pulses: Normal pulses. No decreased pulses.      Heart sounds: S1 normal and S2 normal. Murmur heard.      Systolic murmur is present with a grade of 1/6.      No friction rub. No gallop.   Pulmonary:      Effort: Pulmonary effort is normal. No respiratory distress.      Breath sounds: Normal breath sounds. No wheezing or rales.   Abdominal:      General: Bowel sounds are normal. There is no distension.      Palpations: Abdomen is soft.      Tenderness: There is no abdominal tenderness.     Musculoskeletal:         General: No tenderness or deformity. Normal range of motion.      Cervical back: Normal range of motion and neck supple.      Right lower leg: No edema.      Left lower leg: No edema.     Skin:     General: Skin is warm and dry.      Findings: No rash.     Neurological:      Mental Status: She is alert and oriented to person, place, and time.      Cranial Nerves: No cranial nerve deficit.     Psychiatric:         Thought Content: Thought content normal.         Judgment: Judgment normal.       Counseling / Coordination of Care  Total office time spent today 40 minutes.  Greater than 50% of total time was spent with the patient and / or family counseling and / or coordination of care.           [1]   Patient Active Problem List  Diagnosis    Abnormal weight gain    Backache    Dyspareunia    Head injury    Macular degeneration    Other muscle spasm    Pain in female genitalia on intercourse    Family history of colon cancer    Lumbar radiculopathy    Syrinx of spinal cord (HCC)    Primary osteoarthritis of left knee    Chronic pain of left knee    Joint laxity of left knee    Hypercholesterolemia   [2]   Current Outpatient Medications:      Acetaminophen (TYLENOL PO), Take by mouth if needed, Disp: , Rfl:     chlorhexidine (PERIDEX) 0.12 % solution, SWISH AND SPIT 2 TIMES A DAY AFTER MEALS. DO NOT SWALLOW, Disp: , Rfl:     doxycycline hyclate (VIBRAMYCIN) 100 mg capsule, Take 1 capsule by mouth in the morning and 1 capsule before bedtime., Disp: , Rfl:     levocetirizine (XYZAL) 5 MG tablet, Take 5 mg by mouth, Disp: , Rfl:     methocarbamol (Robaxin-750) 750 mg tablet, Take 1 tablet (750 mg total) by mouth every 6 (six) hours as needed for muscle spasms, Disp: 30 tablet, Rfl: 3    mometasone (ELOCON) 0.1 % ointment, Apply 1 Application topically daily, Disp: , Rfl:     Naproxen Sodium (ALEVE PO), Take by mouth as needed, Disp: , Rfl:     B Complex Vitamins (VITAMIN-B COMPLEX PO), Take by mouth, Disp: , Rfl:     Calcium Carb-Cholecalciferol 600-12.5 MG-MCG CAPS, Take by mouth, Disp: , Rfl:     Cholecalciferol 50 MCG (2000 UT) CAPS, Take 1 capsule by mouth, Disp: , Rfl:     erythromycin (ERYTHROCIN) 250 MG tablet, Take 250 mg by mouth in the morning and 250 mg at noon and 250 mg in the evening. (Patient not taking: Reported on 5/21/2025), Disp: , Rfl:     gabapentin (Neurontin) 300 mg capsule, Take 1 capsule (300 mg total) by mouth 3 (three) times a day (Patient not taking: Reported on 5/21/2025), Disp: 90 capsule, Rfl: 3    Multiple Vitamins-Minerals (PRESERVISION AREDS 2+MULTI VIT PO), , Disp: , Rfl:     predniSONE 10 mg tablet, Take 10 mg by mouth daily (Patient not taking: Reported on 5/21/2025), Disp: , Rfl: